# Patient Record
Sex: MALE | Race: WHITE
[De-identification: names, ages, dates, MRNs, and addresses within clinical notes are randomized per-mention and may not be internally consistent; named-entity substitution may affect disease eponyms.]

---

## 2020-01-11 ENCOUNTER — HOSPITAL ENCOUNTER (INPATIENT)
Dept: HOSPITAL 96 - M.ERS | Age: 60
LOS: 6 days | Discharge: HOME HEALTH | DRG: 177 | End: 2020-01-17
Attending: INTERNAL MEDICINE | Admitting: INTERNAL MEDICINE
Payer: COMMERCIAL

## 2020-01-11 VITALS — SYSTOLIC BLOOD PRESSURE: 159 MMHG | DIASTOLIC BLOOD PRESSURE: 84 MMHG

## 2020-01-11 VITALS — HEIGHT: 60 IN | WEIGHT: 230 LBS | BODY MASS INDEX: 45.16 KG/M2

## 2020-01-11 VITALS — DIASTOLIC BLOOD PRESSURE: 90 MMHG | SYSTOLIC BLOOD PRESSURE: 174 MMHG

## 2020-01-11 VITALS — SYSTOLIC BLOOD PRESSURE: 166 MMHG | DIASTOLIC BLOOD PRESSURE: 145 MMHG

## 2020-01-11 VITALS — SYSTOLIC BLOOD PRESSURE: 174 MMHG | DIASTOLIC BLOOD PRESSURE: 105 MMHG

## 2020-01-11 DIAGNOSIS — Z79.51: ICD-10-CM

## 2020-01-11 DIAGNOSIS — G47.30: ICD-10-CM

## 2020-01-11 DIAGNOSIS — I50.33: ICD-10-CM

## 2020-01-11 DIAGNOSIS — Z83.42: ICD-10-CM

## 2020-01-11 DIAGNOSIS — I25.10: ICD-10-CM

## 2020-01-11 DIAGNOSIS — J98.11: ICD-10-CM

## 2020-01-11 DIAGNOSIS — J44.1: ICD-10-CM

## 2020-01-11 DIAGNOSIS — Z79.2: ICD-10-CM

## 2020-01-11 DIAGNOSIS — I26.99: ICD-10-CM

## 2020-01-11 DIAGNOSIS — I82.409: ICD-10-CM

## 2020-01-11 DIAGNOSIS — Z91.030: ICD-10-CM

## 2020-01-11 DIAGNOSIS — Z79.899: ICD-10-CM

## 2020-01-11 DIAGNOSIS — Z79.82: ICD-10-CM

## 2020-01-11 DIAGNOSIS — E78.5: ICD-10-CM

## 2020-01-11 DIAGNOSIS — J44.0: ICD-10-CM

## 2020-01-11 DIAGNOSIS — F17.210: ICD-10-CM

## 2020-01-11 DIAGNOSIS — Z66: ICD-10-CM

## 2020-01-11 DIAGNOSIS — E11.9: ICD-10-CM

## 2020-01-11 DIAGNOSIS — Z88.8: ICD-10-CM

## 2020-01-11 DIAGNOSIS — Z95.5: ICD-10-CM

## 2020-01-11 DIAGNOSIS — E87.6: ICD-10-CM

## 2020-01-11 DIAGNOSIS — I11.0: ICD-10-CM

## 2020-01-11 DIAGNOSIS — J69.0: Primary | ICD-10-CM

## 2020-01-11 DIAGNOSIS — Z82.49: ICD-10-CM

## 2020-01-11 DIAGNOSIS — I24.9: ICD-10-CM

## 2020-01-11 LAB
ABSOLUTE BASOPHILS: 0.1 THOU/UL (ref 0–0.2)
ABSOLUTE EOSINOPHILS: 0.1 THOU/UL (ref 0–0.7)
ABSOLUTE MONOCYTES: 0.9 THOU/UL (ref 0–1.2)
ALBUMIN SERPL-MCNC: 3.1 G/DL (ref 3.4–5)
ALP SERPL-CCNC: 136 U/L (ref 46–116)
ALT SERPL-CCNC: 29 U/L (ref 30–65)
ANION GAP SERPL CALC-SCNC: 8 MMOL/L (ref 7–16)
AST SERPL-CCNC: 22 U/L (ref 15–37)
BASOPHILS NFR BLD AUTO: 1 %
BILIRUB SERPL-MCNC: 0.7 MG/DL
BUN SERPL-MCNC: 12 MG/DL (ref 7–18)
CALCIUM SERPL-MCNC: 8.2 MG/DL (ref 8.5–10.1)
CHLORIDE SERPL-SCNC: 103 MMOL/L (ref 98–107)
CO2 SERPL-SCNC: 31 MMOL/L (ref 21–32)
CREAT SERPL-MCNC: 1.1 MG/DL (ref 0.6–1.3)
EOSINOPHIL NFR BLD: 1.2 %
GLUCOSE SERPL-MCNC: 109 MG/DL (ref 70–99)
GRANULOCYTES NFR BLD MANUAL: 73.9 %
HCT VFR BLD CALC: 40.5 % (ref 42–52)
HGB BLD-MCNC: 13.9 GM/DL (ref 14–18)
INR PPP: 1
LIPASE: 136 U/L (ref 73–393)
LYMPHOCYTES # BLD: 1.9 THOU/UL (ref 0.8–5.3)
LYMPHOCYTES NFR BLD AUTO: 16.4 %
MCH RBC QN AUTO: 27.8 PG (ref 26–34)
MCHC RBC AUTO-ENTMCNC: 34.3 G/DL (ref 28–37)
MCV RBC: 81.1 FL (ref 80–100)
MONOCYTES NFR BLD: 7.5 %
MPV: 8.2 FL. (ref 7.2–11.1)
NEUTROPHILS # BLD: 8.7 THOU/UL (ref 1.6–8.1)
NT-PRO BRAIN NAT PEPTIDE: 1097 PG/ML (ref ?–300)
NUCLEATED RBCS: 0 /100WBC
PLATELET COUNT*: 260 THOU/UL (ref 150–400)
POTASSIUM SERPL-SCNC: 3.3 MMOL/L (ref 3.5–5.1)
PROT SERPL-MCNC: 7 G/DL (ref 6.4–8.2)
PROTHROMBIN TIME: 10.1 SECONDS (ref 9.2–11.5)
RBC # BLD AUTO: 5 MIL/UL (ref 4.5–6)
RDW-CV: 15.6 % (ref 10.5–14.5)
SODIUM SERPL-SCNC: 142 MMOL/L (ref 136–145)
WBC # BLD AUTO: 11.7 THOU/UL (ref 4–11)

## 2020-01-11 NOTE — NUR
pt heart rythm was abnormal on cardiac monitor, ekg done. ekg shows acute MI.
dr Smyth called. dr stated that pt refused to see him and was not going to
answer to his consult, that pt warned his nurse about cursing the doctor.
doctor asked that the ekgs be faxed to his number. ekg faxed to dr Smyth at
[phone] on 1/11/20. pt does not state any current pain. pt just appears to be
short of breath. on 3 l nc of oxygen. DNR paper placed in front of chart. DNR
bracelet on. pt sitting in recliner. tylenol was given for pain early this
shift. pt warns this nurse to "stay away from his heart" and that "he is not
here for his heart but for his pneumonia". call light within reach

## 2020-01-11 NOTE — NUR
ASSUMED PT CARE REPORT RECEIVED FROM NURSE. PT IS AOX4, ON 3 L NC. O2
SATURATION IS 95%. VSS. COMPLAINS OF PAIN LEVEL 8 IN "LUNGS" AND STATED THAT
NOTHING USUALLY ALLEVIATES HIS PAIN.PT STATES THAT HE IS HERE FOR HIS LUNGS,
NOT FOR HIS HEART.IV FLUID INFUSING AS ORDERED. PATIENT REFUSED FOR TROPONIN
LAB TO BE DRAWN. NURSING STAFF HAD PT SIGN THE REFUSAL FOR LAB DRAWN.
ADMISSION HX AND ASSESSMENT DONE.  TROP PRESENTLY AT 0.45.  CARDIOLOGY
CONSULTED. CALL LIGHT AT REACH.  WILL CONTINUE TO MONITOR

## 2020-01-12 VITALS — DIASTOLIC BLOOD PRESSURE: 99 MMHG | SYSTOLIC BLOOD PRESSURE: 181 MMHG

## 2020-01-12 VITALS — SYSTOLIC BLOOD PRESSURE: 146 MMHG | DIASTOLIC BLOOD PRESSURE: 86 MMHG

## 2020-01-12 VITALS — DIASTOLIC BLOOD PRESSURE: 89 MMHG | SYSTOLIC BLOOD PRESSURE: 154 MMHG

## 2020-01-12 VITALS — SYSTOLIC BLOOD PRESSURE: 198 MMHG | DIASTOLIC BLOOD PRESSURE: 94 MMHG

## 2020-01-12 LAB
ANION GAP SERPL CALC-SCNC: 7 MMOL/L (ref 7–16)
BUN SERPL-MCNC: 16 MG/DL (ref 7–18)
CALCIUM SERPL-MCNC: 8 MG/DL (ref 8.5–10.1)
CHLORIDE SERPL-SCNC: 103 MMOL/L (ref 98–107)
CO2 SERPL-SCNC: 31 MMOL/L (ref 21–32)
CREAT SERPL-MCNC: 1.2 MG/DL (ref 0.6–1.3)
GLUCOSE SERPL-MCNC: 156 MG/DL (ref 70–99)
MAGNESIUM SERPL-MCNC: 1.6 MG/DL (ref 1.8–2.4)
POTASSIUM SERPL-SCNC: 3.5 MMOL/L (ref 3.5–5.1)
SODIUM SERPL-SCNC: 141 MMOL/L (ref 136–145)

## 2020-01-12 NOTE — NUR
PT IRRITABLE AND NOT WANTING ANY TREATMENTS FOR HIS HEART. INCREASED SOB
DURING THE DAY, BREATHING TREATMENTS ADMINISTERED PRN AND SCHEDULED. UP AD
ISIDORO. GOOD ORAL INTAKE. SAT AT THE RECLINER THE WHOLE DAY. GOOD URINE OUTPUT.

## 2020-01-12 NOTE — NUR
PT A+O X4. PT SOA WITH EXERTION. OBTAINED EXTENSION TUBING FOR O2 FOR PT TO
AMBULATE TO THE BATHROOM. PT USES CALL LIGHT APROPRIATELY. PT REPORTS CRONIC
ANXIETY/IRRITABILITY. REFUSED LABS AND VITALS WHILE SLEEPING. ON CALL DR
NOTIFIED PT REQUESTED DNR STATUS THROUGH YOU CALL MD. PT REFUSING ALL CARDIAC
WORKUP AND TREATMENT INCLUDING TROPONIN. PRN ATIVAN EFFECTIVE @ HS. CALL LIGHT
IN REACH. HOURLY ROUNDING FOR SAFETY.

## 2020-01-12 NOTE — EKG
[place] [address]
Phone:  [phone]                     ELECTROCARDIOGRAM REPORT      
_______________________________________________________________________________
 
Name:       JERE ALEXANDRA                      Room:           [address]    ADM IN  
M.R.#:  [id number]      Account #:      [id number]  
Admission:  20     Attend Phys:    RADHA Harris
Discharge:               Date of Birth:  [birth year]  
         Report #: [id number]
    [id number]
_______________________________________________________________________________
THIS REPORT FOR:  //name//                      
 
                         [facility] ED
                                       
Test Date:    2020               Test Time:    07:26:44
Pat Name:     JERE ALEXANDRA                Department:   
Patient ID:   [id number]            Room:         [facility]
Gender:       M                        Technician:   RAVINDER
:          1960               Requested By: Jayro Wilder
Order Number: [id number] MD:   José Smyth
                                 Measurements
Intervals                              Axis          
Rate:         98                       P:            73
RI:           187                      QRS:          -66
QRSD:         147                      T:            114
QT:           402                                    
QTc:          514                                    
                           Interpretive Statements
Sinus rhythm
Ventricular premature complex
Probable left atrial enlargement
Left bundle branch block
No previous ECG available for comparison
 
Electronically Signed On 2020 10:32:50 CST by José Smyth
[email or web address]
 
 
 
 
 
 
 
 
 
 
 
 
 
 
 
 
 
  <ELECTRONICALLY SIGNED>
                                           By: HERNANDEZ Smyth MD, [facility]    
  20     1032
D: 20   _____________________________________
T: 20   HERNANDEZ Smyth MD, [facility]      /EPI

## 2020-01-12 NOTE — NUR
PATIENTS CALL LIGHT WENT OFF - WHEN RN ANSWERED, PATIENT REPORTED HE NEEDED TO
GO TO THE RESTROOM.  PATIENT WAS VISIBLY IN RESPIRATORY DISTRESS; HOWEVER,
REFUSED TO ALLOW THIS RN TO APPLY A GAIT BELT FOR BALANCE OR USE A WALKER FOR
SUPPORT.  ONCE AT THE RESTROOM, PATIENT REFUSED TO ALLOW RN INTO THE BATHROOM
WIHT HIM - PATIENT IS A HIGH FALL RISK.  PATIENT VOIDED AND THEN ASSISTED
BACK TO THE RECLINER IN THE ROOM.  RN MOVED THE RECLINER POSITION SO PATIENT
COULD WATCH TV AS REQUESTED.

## 2020-01-13 VITALS — DIASTOLIC BLOOD PRESSURE: 78 MMHG | SYSTOLIC BLOOD PRESSURE: 144 MMHG

## 2020-01-13 VITALS — SYSTOLIC BLOOD PRESSURE: 179 MMHG | DIASTOLIC BLOOD PRESSURE: 95 MMHG

## 2020-01-13 VITALS — SYSTOLIC BLOOD PRESSURE: 192 MMHG | DIASTOLIC BLOOD PRESSURE: 99 MMHG

## 2020-01-13 VITALS — SYSTOLIC BLOOD PRESSURE: 142 MMHG | DIASTOLIC BLOOD PRESSURE: 82 MMHG

## 2020-01-13 NOTE — NUR
PATIENT RESTING IN CHAIR IN ROOM.  VSJOHNNY VILLANUEVA IS DIFFICULT AT TIMES TO
CONVERSE WITH BUT IS REDIRECTABLE.  HE REQUIRES FREQUENT RT TREATMENTS.
ATTEMPTING PLACEMNET PER PATIENT REQUEST.  HOURLY ROUNDING COMPLETED FOR
PATIENT SAFETY.

## 2020-01-13 NOTE — EKG
[place] [address]
Phone:  [phone]                     ELECTROCARDIOGRAM REPORT      
_______________________________________________________________________________
 
Name:       JERE ALEXANDRA                      Room:           [address]    ADM IN  
M.R.#:  [id number]      Account #:      [id number]  
Admission:  20     Attend Phys:    RADHA Harris
Discharge:               Date of Birth:  [birth year]  
         Report #: [id number]
    [id number]
_______________________________________________________________________________
THIS REPORT FOR:  //name//                      
 
                          [facility]
                                       
Test Date:    2020               Test Time:    18:32:06
Pat Name:     JERE ALEXANDRA                Department:   
Patient ID:   [id number]            Room:         [address]
Gender:       M                        Technician:   JACQUE
:          1960               Requested By: Skye Charlton
Order Number: [id number]    Rocky MD:   Luis Bearden
                                 Measurements
Intervals                              Axis          
Rate:         107                      P:            0
[place]:           154                      QRS:          -67
QRSD:         141                      T:            116
QT:           369                                    
QTc:          493                                    
                           Interpretive Statements
Sinus tachycardia
Left bundle-branch block 
Compared to ECG 2020 07:26:44
significant changes not noted
Electronically Signed On 2020 17:04:24 CST by Luis Bearden
[email or web address]
 
 
 
 
 
 
 
 
 
 
 
 
 
 
 
 
 
 
 
  <ELECTRONICALLY SIGNED>
                                           By: Luis Bearden MD, [facility]   
  20     1704
D: 20   _____________________________________
T: 20   Luis Bearden MD, FAC     /EPI

## 2020-01-13 NOTE — NUR
ASSUMED PT CARE AT [facility] 1930. PT IS AWAKE AND ORIENTED X4. PT IS IRRITABLE
AND WANTED TO BE DNR, SAYS "HE DOES NOT FEEL TOO GOOD, HE THINKS HE'S GONNA
DIE". VITAL SIGNS STABLE AND NO DESATURATIONS NOTED, PT DOES NOT APPEAR TO BE
IN DISTRESS. DR RAMEY INFORMED, PT IS NOW "NO CODE". PT ALSO C/O GENERALIZED
ABDOMINAL PAIN, WOULD NOT ALLOW THIS RN TO PALPATE AND AUSCULTATE ABDOMEN,
STATING HE HAS CHRONIC ABDOMINAL PAIN AND THAT HIS ABDOMEN IS "ABOUT TO
EXPLODE". MORPHINE IV GIVEN PER MAR PER DR RAMEY's TELEPHONE ORDER. PT IS
ABLE TO RELAX AND SAYS PAIN IS BETTER AND WAS ABLE TO SLEEP FOR A LITTLE BIT.
PT IS STILL SHORT OF AIR WITH ACTIVITY, NO DESATURATIONS NOTED. SHOWER
PROVIDED PER PT's REQUEST, ACTIVITY FAIRLY TOLERATED. CALL LIGHT WITHIN REACH.
PT IS KEPT CLOSELY MONITORED. HIGH FALL PRECAUTIONS IN PLACE.

## 2020-01-13 NOTE — NUR
MET WITH PT TO DISCUSS HOME SITUATION/DC PLANNING. PT LIVES WITH HIS
X-GIRLFRIEND'S SON. HIS GIRLFRIEND  LAST FALL.  HE STATES HE HAS BEEN
HAVING INCREASED DIFFICULTY MANAGING LATELY D/T ILLNESS.  VOICED HE MAY NEED
TO HAVE PLACEMENT, AT LEAST SNF, NOT SURE ABOUT LTC.  DISCUSSED INCOME, PT NOT
ABLE TO AFFORD ASSISTED LIVING BUT DOES HAVE MEDICAID. DISCUSSED OPTIONS, OPEN
TO ANY SNF IN HIS INSURANCE NETWORK. CALLED AND FAXED REFERRAL TO [facility].  PT INTERESTED IN COMPLETING DPOA, GAVE FORM AND
INSTRUCTION.  WANTS HIS FRIEND/MARY ELLEN ROMERO.  PT STATES HE HAD A GUARDIAN
FOR SEVERAL YEARS WHEN HE LIVED IN [place].  GAVE NAME/ELIZA MAYS.
CALL TO HER PA OFFICE, SPOKE WITH KT.  THE GUARDIANSHIP WAS 'RELEASED' IN
2016, PT IS HIS OWN PERSON.  WILL FOLLOW

## 2020-01-13 NOTE — NUR
FAXED REFERRALS TO [facility] [phone]; [phone] AND [facility] [phone]; [phone]. CONFIRMED WITH MANJIT/INTAKE AT [facility] AND DALILA/ANDREI AT [facility] THAT THEY RECEIVED REFERRAL.

## 2020-01-13 NOTE — EKG
[place] [address]
Phone:  [phone]                     ELECTROCARDIOGRAM REPORT      
_______________________________________________________________________________
 
Name:       JERE ALEXANDRA                      Room:           [address]    ADM IN  
M.R.#:  [id number]      Account #:      [id number]  
Admission:  20     Attend Phys:    RADHA Harris
Discharge:               Date of Birth:  [birth year]  
         Report #: [id number]
    [id number]
_______________________________________________________________________________
THIS REPORT FOR:  //name//                      
 
                          [facility]
                                       
Test Date:    2020               Test Time:    18:33:07
Pat Name:     JERE ALEXANDRA                Department:   
Patient ID:   [id number]            Room:         [address]
Gender:       M                        Technician:   JACQUE
:          1960               Requested By: Wan Kohler
Order Number: [id number]    Rocky MD:   Luis Bearden
                                 Measurements
Intervals                              Axis          
Rate:         120                      P:            226
SD:           126                      QRS:          -66
QRSD:         139                      T:            94
QT:           436                                    
QTc:          617                                    
                           Interpretive Statements
Sinus tachycardia
Left bundle-branch block
Compared to ECG 2020 07:26:44
no significant changes noted
Electronically Signed On 2020 17:05:00 CST by Luis Bearden
[email or web address]
 
 
 
 
 
 
 
 
 
 
 
 
 
 
 
 
 
 
 
  <ELECTRONICALLY SIGNED>
                                           By: Luis Bearden MD, [facility]   
  20     1705
D: 20 1833   _____________________________________
T: 20   Luis Bearden MD, FACC     /EPI

## 2020-01-14 VITALS — SYSTOLIC BLOOD PRESSURE: 154 MMHG | DIASTOLIC BLOOD PRESSURE: 88 MMHG

## 2020-01-14 VITALS — SYSTOLIC BLOOD PRESSURE: 178 MMHG | DIASTOLIC BLOOD PRESSURE: 92 MMHG

## 2020-01-14 VITALS — DIASTOLIC BLOOD PRESSURE: 100 MMHG | SYSTOLIC BLOOD PRESSURE: 186 MMHG

## 2020-01-14 VITALS — SYSTOLIC BLOOD PRESSURE: 161 MMHG | DIASTOLIC BLOOD PRESSURE: 71 MMHG

## 2020-01-14 LAB
ABSOLUTE BASOPHILS: 0.1 THOU/UL (ref 0–0.2)
ABSOLUTE EOSINOPHILS: 0.2 THOU/UL (ref 0–0.7)
ABSOLUTE MONOCYTES: 0.7 THOU/UL (ref 0–1.2)
ALBUMIN SERPL-MCNC: 3 G/DL (ref 3.4–5)
ALP SERPL-CCNC: 137 U/L (ref 46–116)
ALT SERPL-CCNC: 24 U/L (ref 30–65)
ANION GAP SERPL CALC-SCNC: 4 MMOL/L (ref 7–16)
AST SERPL-CCNC: 16 U/L (ref 15–37)
BASOPHILS NFR BLD AUTO: 0.6 %
BILIRUB SERPL-MCNC: 1 MG/DL
BUN SERPL-MCNC: 15 MG/DL (ref 7–18)
CALCIUM SERPL-MCNC: 8.6 MG/DL (ref 8.5–10.1)
CHLORIDE SERPL-SCNC: 99 MMOL/L (ref 98–107)
CO2 SERPL-SCNC: 34 MMOL/L (ref 21–32)
CREAT SERPL-MCNC: 1 MG/DL (ref 0.6–1.3)
EOSINOPHIL NFR BLD: 2.9 %
GLUCOSE SERPL-MCNC: 139 MG/DL (ref 70–99)
GRANULOCYTES NFR BLD MANUAL: 73 %
HCT VFR BLD CALC: 39.8 % (ref 42–52)
HGB BLD-MCNC: 13.8 GM/DL (ref 14–18)
LYMPHOCYTES # BLD: 1.3 THOU/UL (ref 0.8–5.3)
LYMPHOCYTES NFR BLD AUTO: 15.7 %
MCH RBC QN AUTO: 28 PG (ref 26–34)
MCHC RBC AUTO-ENTMCNC: 34.7 G/DL (ref 28–37)
MCV RBC: 80.6 FL (ref 80–100)
MONOCYTES NFR BLD: 7.8 %
MPV: 8.4 FL. (ref 7.2–11.1)
NEUTROPHILS # BLD: 6.1 THOU/UL (ref 1.6–8.1)
NUCLEATED RBCS: 0 /100WBC
PLATELET COUNT*: 274 THOU/UL (ref 150–400)
POTASSIUM SERPL-SCNC: 3.7 MMOL/L (ref 3.5–5.1)
PROT SERPL-MCNC: 6.8 G/DL (ref 6.4–8.2)
RBC # BLD AUTO: 4.93 MIL/UL (ref 4.5–6)
RDW-CV: 15 % (ref 10.5–14.5)
SODIUM SERPL-SCNC: 137 MMOL/L (ref 136–145)
WBC # BLD AUTO: 8.3 THOU/UL (ref 4–11)

## 2020-01-14 NOTE — NUR
PT IS A/OX4,BP ELEVATED IN 180s/100s.MED-SURG STATUS.PT REMAINS ON 4L O2 NC
WITH LABORED BREATHING.PT C/O CHEST TIGHTNESS-RT CALLED FOR BREATHING
TREATMENT.PT HAS BILATERAL LOWER EXTREMITY EDEMA.PT LEFT RSTING IN CHAIR WITH
CALL LIGHT AND FALL PRECAUTIONS IN PLACE.WILL CONTINUE TO MONITOR.

## 2020-01-14 NOTE — NUR
ASSUMED PT CARE AT [facility] 1930. PT IS AWAKE AND ORIENTED X4. VSS ON 4L OF
O2/NC. PT REMAINED SHORT OF AIR,NO DESATURATIONS NOTED.  PT C/O
GENERALIZED BODY PAIN PARTIALLY RELIEVED BY PAIN MEDS GIVEN PER MAR.
PT IS ABLE TO SLEEP SOME THIS SHIFT. HIGH FALL PRECAUTIONS IN PLACE. CALL
LIGHT WITHIN REACH. HOURLY ROUNDING DONE FOR PT SAFETY.

## 2020-01-14 NOTE — 2DMMODE
[place] [address]
Phone:  [phone] 2 D/M-MODE ECHOCARDIOGRAM     
_______________________________________________________________________________
 
Name:         JERE ALEXANDRA                     Room:          [address]    ADM IN 
.R#:    [id number]     Account #:     [id number]  
Admission:    20    Attend Phys:   Wan Kohler
Discharge:                Date of Birth: [birth year]  
Date of Service: 20 1645  Report #:      [id number]
        [id number]
_______________________________________________________________________________
THIS REPORT FOR:  //name//                      
 
 
--------------- APPROVED REPORT --------------
 
 
Study performed:  2020 14:30:01
 
EXAM: Comprehensive 2D, Doppler, and color-flow 
Echocardiogram 
Patient Location: In-Patient   
Room #:  310     Status:  routine
 
      BSA:         2.12
HR: 91 bpm BP:          186/100 mmHg 
Rhythm: NSR     
 
Other Information 
Study Quality: Good
 
Indications
Congestive Heart Failure
Dyspnea 
Elevated Troponin
 
2D Dimensions
IVSd:  20.40 (7-11mm) LVOT Diam:  20.35 (18-24mm) 
LVDd:  51.06 mm  
PWd:  17.32 (7-11mm) Ascending Ao:  29.69 (22-36mm)
LVDs:  36.54 (25-40mm) 
Aortic Root:  32.35 mm 
 
Volumes
Left Atrial Volume (Systole) 
    LA ESV Index:  43.60 mL/m2
 
Aortic Valve
AoV Peak Navarro.:  2.27 m/s 
AO Peak Gr.:  20.56 mmHg LVOT Max P.11 mmHg
AO Mean Gr.:  11.53 mmHg LVOT Mean P.25 mmHg
    LVOT Max V:  1.42 m/s
AO V2 VTI:  37.03 cm  LVOT Mean V:  0.95 m/s
SUNITA (VTI):  2.19 cm2  LVOT V1 VTI:  24.90 cm
AI Placer:  4.76 m/s2  
AI PHT:  286.97 ms  
 
 
 
[place] [address]
Phone:  [phone]                     2 D/M-MODE ECHOCARDIOGRAM     
_______________________________________________________________________________
 
Name:         [facility]                     Room:          [address] [place] 
M.R.#:    [id number]     Account #:     [id number]  
Admission:    20    Attend Phys:   Wan Kohler
Discharge:                Date of Birth: [birth year]  
Date of Service: 20 1645  Report #:      [id number]
        [id number]
_______________________________________________________________________________
Mitral Valve
MV Mean Gr.:  7.03 mmHg  E/A Ratio:  1.36
    MV Decel. Time:  254.21 ms
MV E Max Navarro.:  1.63 m/s 
MV PHT:  73.72 ms  
MVA (PHT):  2.98 cm2  
 
TDI
E/Lateral E':  23.29 
   Lateral E' Navarro.:  0.07 m/s
 
Pulmonary Valve
PV Peak Navarro.:  1.26 m/s PV Peak Gr.:  6.34 mmHg
 
Left Ventricle
The left ventricle is normal size. There is normal LV segmental wall 
motion. Moderate concentric left ventricular hypertrophy. Left 
ventricular systolic function is normal. LVEF is 55-60%. Transmitral 
Doppler flow pattern suggests impaired LV relaxation.
 
Right Ventricle
The right ventricle is normal size. The right ventricular systolic 
function is normal.
 
Atria
Left atrium is moderately dilated. Right atrium is mildly 
dilated.
 
Aortic Valve
Mild aortic valve sclerosis. Mild aortic regurgitation. There is no 
aortic valvular stenosis.
 
Mitral Valve
The mitral valve is normal in structure. Mild mitral regurgitation. 
No evidence of mitral valve stenosis.
 
Tricuspid Valve
The tricuspid valve is normal in structure. Unable to assess PA 
pressure. Trace tricuspid regurgitation.
 
Pulmonic Valve
The pulmonary valve is normal in structure. There is no pulmonic 
valvular regurgitation.
 
Great Vessels
The aortic root is normal in size. IVC is normal in size and 
 
 
[place] [address]
Phone:  [phone]                     2 D/M-MODE ECHOCARDIOGRAM     
_______________________________________________________________________________
 
Name:         [place] [facility]                     Room:          [address] [place] 
.R.#:    [id number]     Account #:     [id number]  
Admission:    20    Attend Phys:   Wan Kohler
Discharge:                Date of Birth: [birth year]  
Date of Service: 20 1645  Report #:      [id number]
        [id number]
_______________________________________________________________________________
collapses >50% with inspiration.
 
Pericardium
There is no pericardial effusion. Left pleural 
effusion.
 
<Conclusion>
The left ventricle is normal size.
Moderate concentric left ventricular hypertrophy.
Left ventricular systolic function is normal.
LVEF is 55-60%.
Transmitral Doppler flow pattern suggests impaired LV 
relaxation.
Left atrium is moderately dilated.
Right atrium is mildly dilated.
Mild aortic valve sclerosis.
Mild aortic regurgitation.
Mild mitral regurgitation.
Trace tricuspid regurgitation.
IVC is normal in size and collapses >50% with inspiration.
Left pleural effusion.
 
 
 
 
 
 
 
 
 
 
 
 
 
 
 
 
 
 
 
 
 
 
 
  <ELECTRONICALLY SIGNED>
                                           By: Luis Bearden MD, FACC   
  20
D: 20   _____________________________________
T: 20   Luis Bearden MD, FACC     /INF

## 2020-01-14 NOTE — NUR
VSS.MED-SURG STATUS.PT REMAINS ON 4L O2 NC.NO C/O PAIN.IV ANTIBIOTICS GIVEN.PT
HAS BEEN IRRITABLE THROUGHOUT SHIFT.PT GIVEN ONE DOSE OF LASIX THIS SHIFT PER
ORDERS BUT REFUSES TO HAVE ANYMORE.PT ALSO MENTIONED HOSPICE TO THE PHYSICAL
THERAPIST-PT WOULD PROBABLY BENEFIT FROM HAVING DISCUSSION WITH DOCTOR.ECHO
COMPLETED.PT SHOWERED.HOURLY ROUNDING COMPLETED FOR PT SAFETY.PT LEFT RESTING
IN RECLINER WITH CALL LIGHT AND FALL PRECAUTIONS IN PLACE.WILL CONTINUE TO
MONITOR FOR DURATION OF SHIFT.

## 2020-01-14 NOTE — NUR
MARIAMA received call from Juany with [facility] who requested updated
information and will continue to review for possible acceptance to SNF. SW to
continue to follow to assist with finalizing safe dc plan.

## 2020-01-14 NOTE — NUR
FAXED UPDATED REFERRAL TO [facility]. [phone]; [phone]. WILL
FOLLOW TO CONFIRM FACILITY RECEIVED.

## 2020-01-14 NOTE — NUR
PT TRANSFERRED TO UNIT PER WHEELCHAIR WITH BELONGINGS. AOX4, PLEASANT AT
PRESENT. RODRIGUEZ WITH TRANSFER FROM WHEELCHAIR TO RECLINER, CONGESTED PRODUCTIVE
COUGH HEARD. O2 4L NC, BUBBLER PLACED. PT DENIES NEEDS AT PRESENT, MENU GIVEN
PER PT REQUEST. CALL LITE IN EASY REACH. WILL CONTINUE TO MONITOR AND PROVIDE
CARES AS NEEDED UNTIL CHANGE OF SHIFT.

## 2020-01-15 VITALS — DIASTOLIC BLOOD PRESSURE: 98 MMHG | SYSTOLIC BLOOD PRESSURE: 178 MMHG

## 2020-01-15 VITALS — SYSTOLIC BLOOD PRESSURE: 181 MMHG | DIASTOLIC BLOOD PRESSURE: 84 MMHG

## 2020-01-15 VITALS — DIASTOLIC BLOOD PRESSURE: 104 MMHG | SYSTOLIC BLOOD PRESSURE: 189 MMHG

## 2020-01-15 NOTE — NUR
FAXED UPDATES TO [facility]. CONFIRMED THAT THEY RECEIVED AND DECLINED
PLACEMENT. D.O.N. SAID THEY COULD NOT MEET PATIENT'S NEEDS.

## 2020-01-15 NOTE — NUR
PATIENT RESTING UP IN RECLINER. PATIENT REFUSING SOME TREATMENT INCLUDING
LASIX, CLINDAMYCIN AND INSULIN. DR RAMEY AWARE. PATIENT ENCOURAGED TO TAKE
MEDS. PATIENT HAS GOOD APPETITE. PATIENT DENIES ANY NEEDS AT THIS TIME. CALL
LIGHT WITHIN REACH.

## 2020-01-15 NOTE — NUR
PT SLEPT IN RECLINER OVERNIGHT, REQUESTING RT TX AFTER MIDNIGHT. O2 4L SAT
98%. CO BACK AND GENERAL PAIN AT HS, IV MORPHINE GIVEN WITH GOOD RESULT. LFA
SLIV, ABX GIVEN AS ORDERED. USING URINAL TO VOID OVERNIGHT. REFUSING INSULIN
AT HS. NO LABS THIS MORNING. PT STATES HE DOES NOT WANT ANY MORE LASIX, "THAT
STUFF IS WHY MY BACK HURTS, IT RUINS MY KIDNEY.". DNR. ABLE TO USE CALL LITE
AND MAKE NEEDS KNOWN.

## 2020-01-16 VITALS — DIASTOLIC BLOOD PRESSURE: 53 MMHG | SYSTOLIC BLOOD PRESSURE: 181 MMHG

## 2020-01-16 VITALS — DIASTOLIC BLOOD PRESSURE: 70 MMHG | SYSTOLIC BLOOD PRESSURE: 144 MMHG

## 2020-01-16 VITALS — SYSTOLIC BLOOD PRESSURE: 181 MMHG | DIASTOLIC BLOOD PRESSURE: 84 MMHG

## 2020-01-16 NOTE — NUR
PATIENT RESTING UP IN CHAIR. PATIENT IS UP STANDBY ASSIST WITH OXYGEN. PATIENT
IS SHORT OF AIR WITH EXERTION. PATIENT HAD COMPLAINTS OF LOWER BACK PAIN THIS
AM, TREATED ADEQUATELY WITH MORPHINE. PATIENT HAS GOOD APPETITE. PATIENT
DENIES ANY NEEDS AT THIS TIME. CALL LIGHT WITHIN REACH.

## 2020-01-16 NOTE — NUR
Alert and oriented x 4. He is up in the room with stand by assist x 1. He is
voiding well. He did have a shower last evening. He continues to be on O2 at
4L n/c. He refused sliding scale insulin. He refused IV antibiotic
Clindamycin. He said it caused him to have L flank/rib pain,like some one
stabbing him. Dr Kohler notified and orders were recieved.Pain med given x
1.

## 2020-01-16 NOTE — NUR
SW met with pt to discuss dc planning.  SW explained SNF referrals stating
that they are unable to accept pt/unable to meet pt needs.  SW discussed
possibility of pt being able to return home with roommate and pt said that he
spoke with roommate who would be in agreement with pt going home and having in
home follow up services.  SW discussed HH options and pt also discussed
preference for hospice services.  SW to fax referral to pt choice of hospice
agency and to prepare for pt dc home.  Pt concerns are oxygen needs, pain
management and being able to be comfortable "for the rest of the life [that pt
has] left".  SW to continue to follow to assist with final safe dc plan.

## 2020-01-16 NOTE — NUR
Nutrition: pt screen for BMI >40.  Pt noted to have good appetite.  Albumin
mildly low.  Meds reviewed.  Pt appears noncompliant with various medical trx.
CM working on placement.  Assessed at low nutriton risk at this time.

## 2020-01-17 VITALS — DIASTOLIC BLOOD PRESSURE: 89 MMHG | SYSTOLIC BLOOD PRESSURE: 176 MMHG

## 2020-01-17 LAB
ABSOLUTE BASOPHILS: 0.1 THOU/UL (ref 0–0.2)
ABSOLUTE EOSINOPHILS: 0.2 THOU/UL (ref 0–0.7)
ABSOLUTE MONOCYTES: 0.8 THOU/UL (ref 0–1.2)
ANION GAP SERPL CALC-SCNC: 6 MMOL/L (ref 7–16)
BASOPHILS NFR BLD AUTO: 1.3 %
BUN SERPL-MCNC: 19 MG/DL (ref 7–18)
CALCIUM SERPL-MCNC: 8.7 MG/DL (ref 8.5–10.1)
CHLORIDE SERPL-SCNC: 99 MMOL/L (ref 98–107)
CO2 SERPL-SCNC: 34 MMOL/L (ref 21–32)
CREAT SERPL-MCNC: 1 MG/DL (ref 0.6–1.3)
EOSINOPHIL NFR BLD: 2.8 %
GLUCOSE SERPL-MCNC: 121 MG/DL (ref 70–99)
GRANULOCYTES NFR BLD MANUAL: 65.8 %
HCT VFR BLD CALC: 37.9 % (ref 42–52)
HGB BLD-MCNC: 13 GM/DL (ref 14–18)
LYMPHOCYTES # BLD: 1.6 THOU/UL (ref 0.8–5.3)
LYMPHOCYTES NFR BLD AUTO: 19.9 %
MCH RBC QN AUTO: 27.5 PG (ref 26–34)
MCHC RBC AUTO-ENTMCNC: 34.4 G/DL (ref 28–37)
MCV RBC: 80.1 FL (ref 80–100)
MONOCYTES NFR BLD: 10.2 %
MPV: 8 FL. (ref 7.2–11.1)
NEUTROPHILS # BLD: 5.2 THOU/UL (ref 1.6–8.1)
NUCLEATED RBCS: 0 /100WBC
PLATELET COUNT*: 278 THOU/UL (ref 150–400)
POTASSIUM SERPL-SCNC: 3.6 MMOL/L (ref 3.5–5.1)
RBC # BLD AUTO: 4.73 MIL/UL (ref 4.5–6)
RDW-CV: 15 % (ref 10.5–14.5)
SODIUM SERPL-SCNC: 139 MMOL/L (ref 136–145)
WBC # BLD AUTO: 7.8 THOU/UL (ref 4–11)

## 2020-01-17 NOTE — NUR
MARIAMA discussed dc planning with Dr Charlton and plan is for dc home with home
health services to follow.  MARIAMA discussed with pt and arranged for pt
preference of [facility].  Agency accepts pt and will help to find PCP
and then will be able to follow.  Pt friend able to provide pt ride home.

## 2020-01-17 NOTE — NUR
ASSESSMENT COMPLETE. PT DC HOME WITH HH. PT SENT WITH ALL BELONGINGS. PT GIVEN
DC INSTRUCTIONS TO FOLLOW UP WITH PCP, PULMONARY, AND CARDIOLOGY. SEE
ASSESSMENT AND VITALS FOR OTHER DETAILS.

## 2020-01-28 ENCOUNTER — HOSPITAL ENCOUNTER (INPATIENT)
Dept: HOSPITAL 96 - M.ERS | Age: 60
LOS: 1 days | Discharge: LEFT BEFORE DISCHARGE | DRG: 191 | End: 2020-01-29
Attending: FAMILY MEDICINE | Admitting: FAMILY MEDICINE
Payer: COMMERCIAL

## 2020-01-28 VITALS — WEIGHT: 230.01 LBS | HEIGHT: 65.98 IN | BODY MASS INDEX: 36.96 KG/M2

## 2020-01-28 VITALS — DIASTOLIC BLOOD PRESSURE: 53 MMHG | SYSTOLIC BLOOD PRESSURE: 111 MMHG

## 2020-01-28 VITALS — SYSTOLIC BLOOD PRESSURE: 106 MMHG | DIASTOLIC BLOOD PRESSURE: 60 MMHG

## 2020-01-28 VITALS — SYSTOLIC BLOOD PRESSURE: 177 MMHG | DIASTOLIC BLOOD PRESSURE: 81 MMHG

## 2020-01-28 DIAGNOSIS — E11.9: ICD-10-CM

## 2020-01-28 DIAGNOSIS — E87.6: ICD-10-CM

## 2020-01-28 DIAGNOSIS — J44.1: Primary | ICD-10-CM

## 2020-01-28 DIAGNOSIS — Z87.01: ICD-10-CM

## 2020-01-28 DIAGNOSIS — Z95.5: ICD-10-CM

## 2020-01-28 DIAGNOSIS — I50.32: ICD-10-CM

## 2020-01-28 DIAGNOSIS — Z91.14: ICD-10-CM

## 2020-01-28 DIAGNOSIS — Z79.84: ICD-10-CM

## 2020-01-28 DIAGNOSIS — Z91.030: ICD-10-CM

## 2020-01-28 DIAGNOSIS — I25.10: ICD-10-CM

## 2020-01-28 DIAGNOSIS — F41.9: ICD-10-CM

## 2020-01-28 DIAGNOSIS — I11.0: ICD-10-CM

## 2020-01-28 DIAGNOSIS — R07.89: ICD-10-CM

## 2020-01-28 DIAGNOSIS — G47.30: ICD-10-CM

## 2020-01-28 DIAGNOSIS — F17.210: ICD-10-CM

## 2020-01-28 DIAGNOSIS — F12.90: ICD-10-CM

## 2020-01-28 DIAGNOSIS — E44.1: ICD-10-CM

## 2020-01-28 DIAGNOSIS — Z79.899: ICD-10-CM

## 2020-01-28 DIAGNOSIS — E78.5: ICD-10-CM

## 2020-01-28 DIAGNOSIS — Z88.8: ICD-10-CM

## 2020-01-28 LAB
ABSOLUTE BASOPHILS: 0.1 THOU/UL (ref 0–0.2)
ABSOLUTE EOSINOPHILS: 0.3 THOU/UL (ref 0–0.7)
ABSOLUTE MONOCYTES: 0.6 THOU/UL (ref 0–1.2)
ALBUMIN SERPL-MCNC: 3.1 G/DL (ref 3.4–5)
ALP SERPL-CCNC: 132 U/L (ref 46–116)
ALT SERPL-CCNC: 32 U/L (ref 30–65)
ANION GAP SERPL CALC-SCNC: 6 MMOL/L (ref 7–16)
APTT BLD: 26.4 SECONDS (ref 25–31.3)
AST SERPL-CCNC: 21 U/L (ref 15–37)
BASOPHILS NFR BLD AUTO: 1.2 %
BILIRUB SERPL-MCNC: 0.6 MG/DL
BUN SERPL-MCNC: 14 MG/DL (ref 7–18)
CALCIUM SERPL-MCNC: 8.4 MG/DL (ref 8.5–10.1)
CHLORIDE SERPL-SCNC: 102 MMOL/L (ref 98–107)
CO2 SERPL-SCNC: 31 MMOL/L (ref 21–32)
CREAT SERPL-MCNC: 1.2 MG/DL (ref 0.6–1.3)
EOSINOPHIL NFR BLD: 4.4 %
GLUCOSE SERPL-MCNC: 193 MG/DL (ref 70–99)
GRANULOCYTES NFR BLD MANUAL: 60.3 %
HCT VFR BLD CALC: 38.8 % (ref 42–52)
HGB BLD-MCNC: 13.5 GM/DL (ref 14–18)
INR PPP: 1
LYMPHOCYTES # BLD: 1.5 THOU/UL (ref 0.8–5.3)
LYMPHOCYTES NFR BLD AUTO: 25 %
MCH RBC QN AUTO: 28.1 PG (ref 26–34)
MCHC RBC AUTO-ENTMCNC: 34.8 G/DL (ref 28–37)
MCV RBC: 80.7 FL (ref 80–100)
MONOCYTES NFR BLD: 9.1 %
MPV: 7.9 FL. (ref 7.2–11.1)
NEUTROPHILS # BLD: 3.7 THOU/UL (ref 1.6–8.1)
NT-PRO BRAIN NAT PEPTIDE: 860 PG/ML (ref ?–300)
NUCLEATED RBCS: 0 /100WBC
PLATELET COUNT*: 255 THOU/UL (ref 150–400)
POTASSIUM SERPL-SCNC: 3.1 MMOL/L (ref 3.5–5.1)
PROT SERPL-MCNC: 6.8 G/DL (ref 6.4–8.2)
PROTHROMBIN TIME: 10.3 SECONDS (ref 9.2–11.5)
RBC # BLD AUTO: 4.81 MIL/UL (ref 4.5–6)
RDW-CV: 14.7 % (ref 10.5–14.5)
SODIUM SERPL-SCNC: 139 MMOL/L (ref 136–145)
WBC # BLD AUTO: 6.2 THOU/UL (ref 4–11)

## 2020-01-28 NOTE — EKG
[place] [address]
Phone:  [phone]                     ELECTROCARDIOGRAM REPORT      
_______________________________________________________________________________
 
Name:       JERE ALEXANDRA                      Room:                      [facility]  
M.R.#:  [id number]      Account #:      [id number]  
Admission:  20     Attend Phys:                         
Discharge:               Date of Birth:  [birth year]  
         Report #: [id number]
    [id number]
_______________________________________________________________________________
THIS REPORT FOR:  //name//                      
 
                         [facility] ED
                                       
Test Date:    2020               Test Time:    14:21:16
Pat Name:     JERE ALEXANDRA                Department:   
Patient ID:   [id number]            Room:          
Gender:                               Technician:   [facility]
:          1960               Requested By: Roddy Paula
Order Number: [id number] MD:   Luis Bearden
                                 Measurements
Intervals                              Axis          
Rate:         90                       P:            62
AZ:           215                      QRS:          -67
QRSD:         152                      T:            96
QT:           385                                    
QTc:          471                                    
                           Interpretive Statements
Sinus rhythm
Prolonged AZ interval
Left bundle branch block
Compared to ECG 2020 18:33:07
First degree AV block now present
Sinus tachycardia no longer present
 
Electronically Signed On 2020 16:39:39 CST by Luis Bearden
[email or web address]
 
 
 
 
 
 
 
 
 
 
 
 
 
 
 
 
  <ELECTRONICALLY SIGNED>
                                           By: Luis Bearden MD, [facility]   
  20     1639
D: 20 1421   _____________________________________
T: 20 142   Luis Bearden MD, FACC     /EPI

## 2020-01-29 VITALS — DIASTOLIC BLOOD PRESSURE: 69 MMHG | SYSTOLIC BLOOD PRESSURE: 128 MMHG

## 2020-01-29 VITALS — SYSTOLIC BLOOD PRESSURE: 144 MMHG | DIASTOLIC BLOOD PRESSURE: 71 MMHG

## 2020-01-29 VITALS — SYSTOLIC BLOOD PRESSURE: 160 MMHG | DIASTOLIC BLOOD PRESSURE: 81 MMHG

## 2020-01-29 LAB
AMP/METHAMP: POSITIVE
BACTERIA-REFLEX: (no result) /HPF
BILIRUB UR-MCNC: NEGATIVE MG/DL
COLOR UR: YELLOW
HYALINE CASTS #/AREA URNS LPF: (no result) /LPF
KETONES UR STRIP-MCNC: NEGATIVE MG/DL
MUCUS: (no result) STRN/LPF
PROT UR QL STRIP: (no result)
RBC # UR STRIP: NEGATIVE /UL
RBC #/AREA URNS HPF: (no result) /HPF (ref 0–2)
SP GR UR STRIP: 1.02 (ref 1–1.03)
SQUAMOUS: (no result) /LPF (ref 0–3)
URINE CLARITY: CLEAR
URINE GLUCOSE-RANDOM: (no result)
URINE LEUKOCYTES-REFLEX: NEGATIVE
URINE NITRITE-REFLEX: NEGATIVE
URINE WBC-REFLEX: (no result) /HPF (ref 0–5)
UROBILINOGEN UR STRIP-ACNC: 0.2 E.U./DL (ref 0.2–1)

## 2020-01-29 NOTE — EKG
[place] [address]
Phone:  [phone]                     ELECTROCARDIOGRAM REPORT      
_______________________________________________________________________________
 
Name:       NASRINJERE                      Room:           [address]    DIS IN  
M.R.#:  [id number]      Account #:      [id number]  
Admission:  20     Attend Phys:    Song Garza
Discharge:  20     Date of Birth:  [birth year]  
         Report #: [id number]
    [id number]
_______________________________________________________________________________
THIS REPORT FOR:  //name//                      
 
                          [facility]
                                       
Test Date:    2020               Test Time:    03:27:28
Pat Name:     JERE ALEXANDRA                Department:   
Patient ID:   [id number]            Room:         [address]
Gender:       M                        Technician:   MAUREEN
:          1960               Requested By: Song Aguilar
Order Number: [id number]    Reading MD:   Akil Mak
                                 Measurements
Intervals                              Axis          
Rate:         63                       P:            26
AK:           213                      QRS:          -60
QRSD:         146                      T:            -82
QT:           450                                    
QTc:          461                                    
                           Interpretive Statements
Sinus rhythm
left axis
septal q waves
Ventricular premature complex
Prolonged AK interval
Left ventricular hypertrophy with repolarization
Baseline wander in lead(s) V1
Compared to ECG 2020 14:21:16
Ventricular premature complex(es) now present
 
Electronically Signed On 2020 11:46:43 CST by Akil Mak
[email or web address]
 
 
 
 
 
 
 
 
 
 
 
 
 
  <ELECTRONICALLY SIGNED>
                                           By: Akil Mak MD, [facility]      
  20     1146
D: 20 0327   _____________________________________
T: 20 0327   Akil Mak MD, [facility]        /EPI

## 2020-08-01 ENCOUNTER — HOSPITAL ENCOUNTER (EMERGENCY)
Dept: HOSPITAL 75 - ER | Age: 60
Discharge: HOME | End: 2020-08-01
Payer: MEDICARE

## 2020-08-01 VITALS — HEIGHT: 67.72 IN | WEIGHT: 198.42 LBS | BODY MASS INDEX: 30.42 KG/M2

## 2020-08-01 VITALS — SYSTOLIC BLOOD PRESSURE: 160 MMHG | DIASTOLIC BLOOD PRESSURE: 92 MMHG

## 2020-08-01 DIAGNOSIS — F17.210: ICD-10-CM

## 2020-08-01 DIAGNOSIS — R10.84: ICD-10-CM

## 2020-08-01 DIAGNOSIS — J44.1: Primary | ICD-10-CM

## 2020-08-01 DIAGNOSIS — Z91.030: ICD-10-CM

## 2020-08-01 DIAGNOSIS — R16.0: ICD-10-CM

## 2020-08-01 DIAGNOSIS — Z88.8: ICD-10-CM

## 2020-08-01 LAB
ALBUMIN SERPL-MCNC: 4.1 GM/DL (ref 3.2–4.5)
ALP SERPL-CCNC: 135 U/L (ref 40–136)
ALT SERPL-CCNC: 20 U/L (ref 0–55)
APTT PPP: YELLOW S
BACTERIA #/AREA URNS HPF: NEGATIVE /HPF
BARBITURATES UR QL: NEGATIVE
BASOPHILS # BLD AUTO: 0 10^3/UL (ref 0–0.1)
BASOPHILS NFR BLD AUTO: 0 % (ref 0–10)
BENZODIAZ UR QL SCN: NEGATIVE
BILIRUB SERPL-MCNC: 0.6 MG/DL (ref 0.1–1)
BILIRUB UR QL STRIP: NEGATIVE
BUN/CREAT SERPL: 13
CALCIUM SERPL-MCNC: 9 MG/DL (ref 8.5–10.1)
CHLORIDE SERPL-SCNC: 97 MMOL/L (ref 98–107)
CO2 SERPL-SCNC: 28 MMOL/L (ref 21–32)
COCAINE UR QL: NEGATIVE
CREAT SERPL-MCNC: 1.12 MG/DL (ref 0.6–1.3)
EOSINOPHIL # BLD AUTO: 0.3 10^3/UL (ref 0–0.3)
EOSINOPHIL NFR BLD AUTO: 3 % (ref 0–10)
ERYTHROCYTE [DISTWIDTH] IN BLOOD BY AUTOMATED COUNT: 14.6 % (ref 10–14.5)
FIBRINOGEN PPP-MCNC: CLEAR MG/DL
GFR SERPLBLD BASED ON 1.73 SQ M-ARVRAT: > 60 ML/MIN
GLUCOSE SERPL-MCNC: 196 MG/DL (ref 70–105)
GLUCOSE UR STRIP-MCNC: NEGATIVE MG/DL
HCT VFR BLD CALC: 40 % (ref 40–54)
HGB BLD-MCNC: 14.2 G/DL (ref 13.3–17.7)
KETONES UR QL STRIP: NEGATIVE
LEUKOCYTE ESTERASE UR QL STRIP: NEGATIVE
LIPASE SERPL-CCNC: 46 U/L (ref 8–78)
LYMPHOCYTES # BLD AUTO: 2.2 X 10^3 (ref 1–4)
LYMPHOCYTES NFR BLD AUTO: 28 % (ref 12–44)
MANUAL DIFFERENTIAL PERFORMED BLD QL: NO
MCH RBC QN AUTO: 28 PG (ref 25–34)
MCHC RBC AUTO-ENTMCNC: 35 G/DL (ref 32–36)
MCV RBC AUTO: 79 FL (ref 80–99)
METHADONE UR QL SCN: NEGATIVE
METHAMPHETAMINE SCREEN URINE S: NEGATIVE
MONOCYTES # BLD AUTO: 0.8 X 10^3 (ref 0–1)
MONOCYTES NFR BLD AUTO: 11 % (ref 0–12)
NEUTROPHILS # BLD AUTO: 4.6 X 10^3 (ref 1.8–7.8)
NEUTROPHILS NFR BLD AUTO: 58 % (ref 42–75)
NITRITE UR QL STRIP: NEGATIVE
OPIATES UR QL SCN: NEGATIVE
OXYCODONE UR QL: NEGATIVE
PH UR STRIP: 6 [PH] (ref 5–9)
PLATELET # BLD: 219 10^3/UL (ref 130–400)
PMV BLD AUTO: 11 FL (ref 7.4–10.4)
POTASSIUM SERPL-SCNC: 3.4 MMOL/L (ref 3.6–5)
PROPOXYPH UR QL: NEGATIVE
PROT SERPL-MCNC: 7.1 GM/DL (ref 6.4–8.2)
PROT UR QL STRIP: (no result)
RBC #/AREA URNS HPF: (no result) /HPF
SODIUM SERPL-SCNC: 136 MMOL/L (ref 135–145)
SP GR UR STRIP: 1.02 (ref 1.02–1.02)
SQUAMOUS #/AREA URNS HPF: (no result) /HPF
TRICYCLICS UR QL SCN: NEGATIVE
WBC # BLD AUTO: 7.9 10^3/UL (ref 4.3–11)
WBC #/AREA URNS HPF: (no result) /HPF

## 2020-08-01 PROCEDURE — 83690 ASSAY OF LIPASE: CPT

## 2020-08-01 PROCEDURE — 81000 URINALYSIS NONAUTO W/SCOPE: CPT

## 2020-08-01 PROCEDURE — 86141 C-REACTIVE PROTEIN HS: CPT

## 2020-08-01 PROCEDURE — 80053 COMPREHEN METABOLIC PANEL: CPT

## 2020-08-01 PROCEDURE — 74177 CT ABD & PELVIS W/CONTRAST: CPT

## 2020-08-01 PROCEDURE — 36415 COLL VENOUS BLD VENIPUNCTURE: CPT

## 2020-08-01 PROCEDURE — 80306 DRUG TEST PRSMV INSTRMNT: CPT

## 2020-08-01 PROCEDURE — 85025 COMPLETE CBC W/AUTO DIFF WBC: CPT

## 2020-08-01 PROCEDURE — 80320 DRUG SCREEN QUANTALCOHOLS: CPT

## 2020-08-01 NOTE — XMS REPORT
[facility]

                             Created on: 2018



Pedro Luis Parag

External Reference #: [id number]

: 1960

Sex: Male



Demographics





                          Address                   [address] [place]  [address]

 

                          Preferred Language        Unknown

 

                          Marital Status            Unknown

 

                          Sabianist Affiliation     Unknown

 

                          Race                      Unknown

 

                          Ethnic Group              Unknown





Author





                          Author                    Parag CHAMBERS

 

                          Organization              [facility][1]

 

                          Address                   [address] [place]  [address]



 

                          Phone                     [phone]







Care Team Providers





                    Care Team Member Name Role                Phone

 

                    LISANDRO CHAMBERS      Unavailable         [phone]







PROBLEMS





          Type      Condition ICD9-CM Code PIO38-LF Code Onset Dates Condition S

tatus SNOMED 

Code

 

          Problem   Essential hypertension           I10                 Active 

   78461873

 

           Problem    Chronic obstructive pulmonary disease, unspecified COPD ty

pe            J44.9                 

Active                                  80563074

 

                          Problem                   Type 2 diabetes mellitus wit

hout complication, without long-term current

use of insulin              E11.9                     Active       135084056

 

                          Problem                   Coronary artery disease invo

lving native coronary artery of native heart

without angina pectoris              I25.10                    Active       1641

[id number]

 

          Problem   Reactive depression           F32.9               Active    

21057892







ALLERGIES

No Information



ENCOUNTERS





                Encounter       Location        Date            Diagnosis

 

                    [facility]       [address]  [address] [id number], [place] [id number]                                 

 

                    [facility] [address] [id number], [place] [id number] 

Dec, 2018                                

 

                          [facility]     [address] [facility] [id number]

[address], [place] [address]

                          20 Dec, 2018               

 

                    [facility]       [address]  [address] [id number], [place] [id number] 

Dec, 2018                                

 

                    [facility] [place] [address]  [address] [id number] [id number] 11 

Dec, 2018                               Coronary artery disease involving native

 coronary artery of native 

heart without angina pectoris I25.10 ; Essential hypertension I10 ; Type 2 
diabetes mellitus without complication, without long-term current use of insulin
E11.9 ; Cellulitis of lower extremity, unspecified laterality L03.119 ; History 
of CHF (congestive heart failure) Z86.79 and Acute bacterial conjunctivitis of 
both eyes H10.33

 

                    [facility]       [address]  [address] [id number] [place], [place] [id number] 11 

Dec, 2018                                

 

                    [facility]     [address] [id number] [place]

S [id number] 

                                        Type 2 diabetes mellitus without complic

ation, without long-term current use of 

insulin E11.9 ; Essential hypertension I10 ; Coronary artery disease involving 
native coronary artery of native heart without angina pectoris I25.10 and 
Chronic obstructive pulmonary disease, unspecified COPD type J44.9

 

                    [facility] [address]       [address] [id number] [place], [place] [id number]                                 

 

                    [facility] [place]       [address] [id number] [place], [place] [id number]                                Coronary artery disease involving native

 coronary artery of native 

heart without angina pectoris I25.10 ; Essential hypertension I10 ; Type 2 
diabetes mellitus without complication, without long-term current use of insulin
E11.9 ; Chronic obstructive pulmonary disease, unspecified COPD type J44.9 and 
Reactive depression F32.9







IMMUNIZATIONS

No Known Immunizations



SOCIAL HISTORY

Never Assessed



REASON FOR VISIT

Returned call



PLAN OF CARE





VITAL SIGNS





MEDICATIONS

Unknown Medications



RESULTS

No Results



PROCEDURES

No Known procedures



INSTRUCTIONS





MEDICATIONS ADMINISTERED

No Known Medications



MEDICAL (GENERAL) HISTORY





                    Type                Description         Date

 

                    Medical History     congestive heart failure  

 

                    Medical History     atherosclerosis      

 

                    Medical History     hypertension         

 

                    Medical History     diabetes type 2      

 

                    Medical History     COPD                 

 

                    Medical History     MI age  27           

 

                    Surgical History    fredi w/screw in right leg 

 

                    Surgical History    stints x2           

 

                    Surgical History    Teeth removal       

 

                    Surgical History    sleep apnea surgery  

 

                    Hospitalization History [facility] Heart issues 2018

 

                    Hospitalization History [facility] for CP and hypertension

 11/15/2018

## 2020-08-01 NOTE — XMS REPORT
[facility]

                             Created on: 2018



Pedro Luis Parag

External Reference #: [id number]

: 1960

Sex: Male



Demographics





                          Address                   [address] [place]  [address]

 

                          Preferred Language        Unknown

 

                          Marital Status            Unknown

 

                          Christianity Affiliation     Unknown

 

                          Race                      Unknown

 

                          Ethnic Group              Unknown





Author





                          Author                    Parag MATA

 

                          Organization              [facility]

 

                          Address                   [address] [place]  [address]



 

                          Phone                     [phone]







Care Team Providers





                    Care Team Member Name Role                Phone

 

                    KEYONA MATA    Unavailable         [phone]







PROBLEMS





          Type      Condition ICD9-CM Code IWP06-AF Code Onset Dates Condition S

tatus SNOMED 

Code

 

          Problem   Essential hypertension           I10                 Active 

   13480892

 

           Problem    Chronic obstructive pulmonary disease, unspecified COPD ty

pe            J44.9                 

Active                                  21680128

 

                          Problem                   Type 2 diabetes mellitus wit

hout complication, without long-term current

use of insulin              E11.9                     Active       919426201

 

                          Problem                   Coronary artery disease invo

lving native coronary artery of native heart

without angina pectoris              I25.10                    Active       1641

[id number]

 

          Problem   Reactive depression           F32.9               Active    

94415195







ALLERGIES

No Information



ENCOUNTERS





                Encounter       Location        Date            Diagnosis

 

                    [facility]  [address] [id number] [id number]                                 

 

                    [facility]  [address] [id number], [place] [id number] 21 

Dec, 2018                                

 

                    [facility]  [address] [id number] [id number] 11 

Dec, 2018                               Coronary artery disease involving native

 coronary artery of native 

heart without angina pectoris I25.10 ; Essential hypertension I10 ; Type 2 
diabetes mellitus without complication, without long-term current use of insulin
E11.9 ; Cellulitis of lower extremity, unspecified laterality L03.119 ; History 
of CHF (congestive heart failure) Z86.79 and Acute bacterial conjunctivitis of 
both eyes H10.33

 

                    [facility]  [address] [id number] [id number] 11 

Dec, 2018                                

 

                    [facility]     [address] [id number] [facility] [id number] 

                                        Type 2 diabetes mellitus without complic

ation, without long-term current use of 

insulin E11.9 ; Essential hypertension I10 ; Coronary artery disease involving 
native coronary artery of native heart without angina pectoris I25.10 and 
Chronic obstructive pulmonary disease, unspecified COPD type J44.9

 

                    [facility]       [address] [id number] [place], [place] [id number]                                 

 

                    [facility]       [address]  [address] [id number] [place], [place] [id number]                                Coronary artery disease involving native

 coronary artery of native 

heart without angina pectoris I25.10 ; Essential hypertension I10 ; Type 2 
diabetes mellitus without complication, without long-term current use of insulin
E11.9 ; Chronic obstructive pulmonary disease, unspecified COPD type J44.9 and 
Reactive depression F32.9







IMMUNIZATIONS

No Known Immunizations



SOCIAL HISTORY

Never Assessed



REASON FOR VISIT

phone call



PLAN OF CARE





VITAL SIGNS





MEDICATIONS

Unknown Medications



RESULTS

No Results



PROCEDURES

No Known procedures



INSTRUCTIONS





MEDICATIONS ADMINISTERED

No Known Medications



MEDICAL (GENERAL) HISTORY





                    Type                Description         Date

 

                    Medical History     congestive heart failure  

 

                    Medical History     atherosclerosis      

 

                    Medical History     hypertension         

 

                    Medical History     diabetes type 2      

 

                    Medical History     COPD                 

 

                    Medical History     MI age  27           

 

                    Surgical History    fredi w/screw in right leg 

 

                    Surgical History    stints x2           

 

                    Surgical History    Teeth removal       

 

                    Surgical History    sleep apnea surgery  

 

                    Hospitalization History [facility] Heart issues 2018

 

                    Hospitalization History [facility] for CP and hypertension

 11/15/2018

## 2020-08-01 NOTE — XMS REPORT
Continuity of Care Document

                             Created on: 2020



CORNELIUS ROY

External Reference #: [id number]

: 1960

Sex: Male



Demographics





                          Address                   [address]  [address]

 

                          Home Phone                [phone] x

 

                          Preferred Language        Unknown

 

                          Marital Status            Unknown

 

                          Shinto Affiliation     Unknown

 

                          Race                      Unknown

 

                          Ethnic Group              Unknown





Author





                          Organization              Unknown

 

                          Address                   Unknown

 

                          Phone                     Unavailable



              



Allergies

      



             Active           Description           Code           Type         

  Severity   

                Reaction           Onset           Reported/Identified          

 

Relationship to Patient                 Clinical Status        

 

             Yes           BEE STINGS           39           Miscellaneous Aller

gy           

Unknown           N/A                        2020                         

  

     

 

                Yes             DIPHENHYDRAMINE           [id number]           D

rug Allergy       

             Unknown           BP HIGH                        2020        

           

                                                 

 

             Yes           FUROSEMIDE           [id number]           Drug Allerg

y           

Unknown           RENAL FAILURE                           2020            

    

                                                 

 

             Yes           LABETALOL           [id number]           Drug Allergy

           

Unknown           VOMIT                        2020                       

  

       

 

                Yes             NITROGLYCERIN           [id number]           Nils

g Allergy         

                Unknown           HEADACHE AND VOMITING                         

  2020       

                                                             



                          



Medications

      



There is no data.                  



Problems

      



             Date Dx Coded           Attending           Type           Code    

       

Diagnosis                               Diagnosed By        

 

             2020           Torito Hernandez

.9           

PNEUMONIA, UNSPECIFIED ORGANISM                    

 

             2020           Sudholt, Torito RUSSO            J44

.1           

CHRONIC OBSTRUCTIVE PULMONARY DISEASE W (ACUTE) EX                    

 

             2020           Sudholt, Torito WILLARD            R06

.02           

SHORTNESS OF BREATH                              

 

             2020           Naomiholt, Torito PENNINGTON18

.9           

PNEUMONIA, UNSPECIFIED ORGANISM                    

 

             2020           Sudholt, Torito RUSSO            J44

.1           

CHRONIC OBSTRUCTIVE PULMONARY DISEASE W (ACUTE) EX                    

 

             2020           NaomiholtTorito            R06

.02           

SHORTNESS OF BREATH                              

 

             2020           Sudholt, Torito PENNINGTON18

.9           

PNEUMONIA, UNSPECIFIED ORGANISM                    

 

             2020           Sudholt, Torito RUSSO            J44

.1           

CHRONIC OBSTRUCTIVE PULMONARY DISEASE W (ACUTE) EX                    

 

             2020           Naomiholt, Torito WILLARD            R06

.02           

SHORTNESS OF BREATH                              

 

             2020           Naomiholt, Torito PENNINGTON18

.9           

PNEUMONIA, UNSPECIFIED ORGANISM                    

 

             2020           Naomiholt, Torito RUSSO            J44

.1           

CHRONIC OBSTRUCTIVE PULMONARY DISEASE W (ACUTE) EX                    

 

             2020           Naomiholt, Torito WILLARD            R06

.02           

SHORTNESS OF BREATH                              

 

             2020           Sudholt, Torito PENNINGTON18

.9           

PNEUMONIA, UNSPECIFIED ORGANISM                    

 

             2020           Sudholt, Torito RUSSO            J44

.1           

CHRONIC OBSTRUCTIVE PULMONARY DISEASE W (ACUTE) EX                    

 

             2020           Sudholt, Torito WLILARD            R06

.02           

SHORTNESS OF BREATH                              

 

             2020           Sudholt, Torito RUSSO            I50

.33           

ACUTE ON CHRONIC DIASTOLIC (CONGESTIVE) HEART FAIL                    

 

             2020           Sudholt, Torito RUSSO            J44

.1           

CHRONIC OBSTRUCTIVE PULMONARY DISEASE W (ACUTE) EX                    

 

             2020           Sudholt, Torito RUSSO            J69

.0           

PNEUMONITIS DUE TO INHALATION OF FOOD AND VOMIT                    

 

             2020           Sudholt, Torito WILLARD            R06

.02           

SHORTNESS OF BREATH                              

 

             2020           Sudholt, Torito RUSSO            I50

.33           

ACUTE ON CHRONIC DIASTOLIC (CONGESTIVE) HEART FAIL                    

 

             2020           Sudholt, Torito RUSSO            J44

.1           

CHRONIC OBSTRUCTIVE PULMONARY DISEASE W (ACUTE) EX                    

 

             2020           Sudholt, Torito RUSSO            J69

.0           

PNEUMONITIS DUE TO INHALATION OF FOOD AND VOMIT                    

 

             2020           Sudholt, Torito WILLARD            R06

.02           

SHORTNESS OF BREATH                              

 

             2020           Sudholt, Torito RUSSO            I50

.33           

ACUTE ON CHRONIC DIASTOLIC (CONGESTIVE) HEART FAIL                    

 

             2020           Sudholt, Torito RUSSO            J44

.1           

CHRONIC OBSTRUCTIVE PULMONARY DISEASE W (ACUTE) EX                    

 

             2020           Sudholt, Torito RUSSO            J69

.0           

PNEUMONITIS DUE TO INHALATION OF FOOD AND VOMIT                    

 

             2020           Sudholt, Torito WILLARD            R06

.02           

SHORTNESS OF BREATH                              

 

             2020           Sudholt, Torito RUSSO            I50

.33           

ACUTE ON CHRONIC DIASTOLIC (CONGESTIVE) HEART FAIL                    

 

             2020           Sudholt, Torito RUSSO            J44

.1           

CHRONIC OBSTRUCTIVE PULMONARY DISEASE W (ACUTE) EX                    

 

             2020           Sudholt, Torito RUSSO            J69

.0           

PNEUMONITIS DUE TO INHALATION OF FOOD AND VOMIT                    

 

             2020           Sudholt, Torito WILLARD            R06

.02           

SHORTNESS OF BREATH                              

 

             2020           Sudholt, Torito RUSSO            I50

.33           

ACUTE ON CHRONIC DIASTOLIC (CONGESTIVE) HEART FAIL                    

 

             2020           Sudholt, Torito RUSSO            J44

.1           

CHRONIC OBSTRUCTIVE PULMONARY DISEASE W (ACUTE) EX                    

 

             2020           Sudholt, Torito RUSSO            J69

.0           

PNEUMONITIS DUE TO INHALATION OF FOOD AND VOMIT                    

 

             2020           Sudholt, Torito WILLARD            R06

.02           

SHORTNESS OF BREATH                              

 

             2020           Sudholt, Torito RUSSO            I50

.33           

ACUTE ON CHRONIC DIASTOLIC (CONGESTIVE) HEART FAIL                    

 

             2020           Sudholt, Torito RUSSO            J44

.1           

CHRONIC OBSTRUCTIVE PULMONARY DISEASE W (ACUTE) EX                    

 

             2020           Sudholt, Torito RUSSO            J69

.0           

PNEUMONITIS DUE TO INHALATION OF FOOD AND VOMIT                    

 

             2020           Sudholt, Torito WILLARD            R06

.02           

SHORTNESS OF BREATH                              

 

             01/15/2020           Sudholt, Torito RUSSO            I50

.33           

ACUTE ON CHRONIC DIASTOLIC (CONGESTIVE) HEART FAIL                    

 

             01/15/2020           Sudholt, Torito RUSSO            J44

.1           

CHRONIC OBSTRUCTIVE PULMONARY DISEASE W (ACUTE) EX                    

 

             01/15/2020           Sudholt, Torito RUSSO            J69

.0           

PNEUMONITIS DUE TO INHALATION OF FOOD AND VOMIT                    

 

             01/15/2020           Sudholt, Torito WILLARD            R06

.02           

SHORTNESS OF BREATH                              

 

             01/15/2020           Sudholt, Torito RUSSO            I50

.33           

ACUTE ON CHRONIC DIASTOLIC (CONGESTIVE) HEART FAIL                    

 

             01/15/2020           Sudholt, Torito RUSSO            J44

.1           

CHRONIC OBSTRUCTIVE PULMONARY DISEASE W (ACUTE) EX                    

 

             01/15/2020           Sudholt, Torito RUSSO            J69

.0           

PNEUMONITIS DUE TO INHALATION OF FOOD AND VOMIT                    

 

             01/15/2020           Sudholt, Torito WILLARD            R06

.02           

SHORTNESS OF BREATH                              

 

             2020           Sudholt, Torito RUSSO            I50

.33           

ACUTE ON CHRONIC DIASTOLIC (CONGESTIVE) HEART FAIL                    

 

             2020           Sudholt, Toirto RUSSO            J44

.1           

CHRONIC OBSTRUCTIVE PULMONARY DISEASE W (ACUTE) EX                    

 

             2020           Sudholt, Torito RUSSO            J69

.0           

PNEUMONITIS DUE TO INHALATION OF FOOD AND VOMIT                    

 

             2020           Sudholt, Torito WILLARD            R06

.02           

SHORTNESS OF BREATH                              

 

             2020           Sudholt, Torito RUSSO            I50

.33           

ACUTE ON CHRONIC DIASTOLIC (CONGESTIVE) HEART FAIL                    

 

             2020           Sudholt, Torito RUSSO            J44

.1           

CHRONIC OBSTRUCTIVE PULMONARY DISEASE W (ACUTE) EX                    

 

             2020           Sudholt, Torito RUSSO            J69

.0           

PNEUMONITIS DUE TO INHALATION OF FOOD AND VOMIT                    

 

             2020           Sudholt, Torito WILLARD            R06

.02           

SHORTNESS OF BREATH                              

 

             2020           Sudholt, Torito RUSSO            I50

.33           

ACUTE ON CHRONIC DIASTOLIC (CONGESTIVE) HEART FAIL                    

 

             2020           Sudholt, Torito RUSSO            J44

.1           

CHRONIC OBSTRUCTIVE PULMONARY DISEASE W (ACUTE) EX                    

 

             2020           Sudholt, Torito RUSSO            J69

.0           

PNEUMONITIS DUE TO INHALATION OF FOOD AND VOMIT                    

 

             2020           Sudholt, Torito WILLARD            R06

.02           

SHORTNESS OF BREATH                              

 

             2020           Sudholt, Torito RUSSO            I50

.33           

ACUTE ON CHRONIC DIASTOLIC (CONGESTIVE) HEART FAIL                    

 

             2020           Sudholt, Torito RUSSO            J44

.1           

CHRONIC OBSTRUCTIVE PULMONARY DISEASE W (ACUTE) EX                    

 

             2020           Sudholt, Torito RUSSO            J69

.0           

PNEUMONITIS DUE TO INHALATION OF FOOD AND VOMIT                    

 

             2020           Sudholt, Torito WILLARD            R06

.02           

SHORTNESS OF BREATH                              

 

             2020           Sudholt, Torito RUSSO            I50

.33           

ACUTE ON CHRONIC DIASTOLIC (CONGESTIVE) HEART FAIL                    

 

             2020           Sudholt, Torito RUSSO            J44

.1           

CHRONIC OBSTRUCTIVE PULMONARY DISEASE W (ACUTE) EX                    

 

             2020           Sudholt, Torito RUSSO            J69

.0           

PNEUMONITIS DUE TO INHALATION OF FOOD AND VOMIT                    

 

             2020           Sudholt, Torito WILLARD            R06

.02           

SHORTNESS OF BREATH                              

 

             2020           Sudholt, Torito RUSSO            I50

.33           

ACUTE ON CHRONIC DIASTOLIC (CONGESTIVE) HEART FAIL                    

 

             2020           Sudholt, Torito RUSSO            J44

.1           

CHRONIC OBSTRUCTIVE PULMONARY DISEASE W (ACUTE) EX                    

 

             2020           Sudholt, Torito RUSSO            J69

.0           

PNEUMONITIS DUE TO INHALATION OF FOOD AND VOMIT                    

 

             2020           Sudholt, Torito WILLARD            R06

.02           

SHORTNESS OF BREATH                              

 

             2020           Sudholt, Torito CURRY            E11

.9           

TYPE 2 DIABETES MELLITUS WITHOUT COMPLICATIONS                    

 

             2020           Sudholt, Torito CURRY            E78

.5           

HYPERLIPIDEMIA, UNSPECIFIED                      

 

             2020           Sudholt, Torito CURRY            E87

.6           

HYPOKALEMIA                                      

 

                2020           Sudholt, Torito CURRY            

   F17.210          

                          NICOTINE DEPENDENCE, CIGARETTES, UNCOMPLICATED        

            

 

             2020           Sudholt, Torito CURRY            G47

.30           

SLEEP APNEA, UNSPECIFIED                         

 

             2020           Sudholt, Torito CURRY            I11

.0           

HYPERTENSIVE HEART DISEASE WITH HEART FAILURE                    

 

             2020           Sudholt, Torito CURRY            I24

.9           

ACUTE ISCHEMIC HEART DISEASE, UNSPECIFIED                    

 

             2020           Sudholt, Torito CURRY            I25

.10           

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                    

 

             2020           Sudholt, Torito CURRY            I26

.99           

OTHER PULMONARY EMBOLISM WITHOUT ACUTE COR PULMONA                    

 

             2020           Sudholt, Torito CURRY            I50

.33           

ACUTE ON CHRONIC DIASTOLIC (CONGESTIVE) HEART FAIL                    

 

                2020           Sudholt, Torito CURRY            

   I82.409          

                          ACUTE EMBOLISM AND THOMBOS UNSP DEEP VN UNSP LOWER    

                

 

             2020           Sudholt, Torito CURRY            J44

.0           

CHR OBSTRUCTIVE PULMON DISEASE WITH (ACUTE) LOWER                     

 

             2020           Sudholt, Torito CURRY            J44

.1           

CHRONIC OBSTRUCTIVE PULMONARY DISEASE W (ACUTE) EX                    

 

             2020           Sudholt, Torito CURRY            J69

.0           

PNEUMONITIS DUE TO INHALATION OF FOOD AND VOMIT                    

 

             2020           Sudholt, Torito CURRY            J98

.11           

ATELECTASIS                                      

 

             2020           Sudholt, Torito WILLARD            R06

.02           

SHORTNESS OF BREATH                              

 

             2020           Sudholt, Torito CURRY            Z66

           DO 

NOT RESUSCITATE                                  

 

             2020           Sudholt, Tortio CURRY            Z79

.2           

LONG TERM (CURRENT) USE OF ANTIBIOTICS                    

 

             2020           Sudholt, Torito CURRY            Z79

.51           

LONG TERM (CURRENT) USE OF INHALED STEROIDS                    

 

             2020           Sudholt, Torito CURRY            Z79

.82           

LONG TERM (CURRENT) USE OF ASPIRIN                    

 

                2020           Sudholt, Torito CURRY            

   Z79.899          

                          OTHER LONG TERM (CURRENT) DRUG THERAPY                

    

 

             2020           Sudholt, Torito CURRY            Z82

.49           

FAMILY HX OF ISCHEM HEART DIS AND OTH DIS OF THE C                    

 

             2020           Sudholt, Torito CURRY            Z83

.42           

FAMILY HISTORY OF FAMILIAL HYPERCHOLESTEROLEMIA                    

 

             2020           Sudholt, Torito CURRY            Z88

.8           

ALLERGY STATUS TO OTH DRUG/MEDS/BIOL SUBST STATUS                    

 

                2020           Sudholt, Torito CURRY            

   Z91.030          

                          BEE ALLERGY STATUS                    

 

             2020           Sudholt, Torito CURRY            Z95

.5           

PRESENCE OF CORONARY ANGIOPLASTY IMPLANT AND GRAFT                    

 

             2020           Sudholt, Torito RUSSO            I50

.33           

ACUTE ON CHRONIC DIASTOLIC (CONGESTIVE) HEART FAIL                    

 

             2020           Sudholt, Torito RUSSO            J44

.1           

CHRONIC OBSTRUCTIVE PULMONARY DISEASE W (ACUTE) EX                    

 

             2020           Sudholt, Torito RUSSO            J69

.0           

PNEUMONITIS DUE TO INHALATION OF FOOD AND VOMIT                    

 

             2020           Naomiholt, Torito WILLARD            R06

.02           

SHORTNESS OF BREATH                              

 

                2020           PolancoKamilah Maurice MD     

          J44.1     

                          CHRONIC OBSTRUCTIVE PULMONARY DISEASE W (ACUTE) EX    

                

 

                2020           PolancoKamilah Maurice MD.           MONTSE     

          R07.9     

                          CHEST PAIN, UNSPECIFIED                    

 

                2020           PolancoKamilah Maurice MD     

          J44.1     

                          CHRONIC OBSTRUCTIVE PULMONARY DISEASE W (ACUTE) EX    

                

 

                2020           PolancoKamilah Maurice MD.           A     

          R07.9     

                          CHEST PAIN, UNSPECIFIED                    

 

                2020           PolancoKamilah Maurice MD     

          J44.1     

                          CHRONIC OBSTRUCTIVE PULMONARY DISEASE W (ACUTE) EX    

                

 

                2020           PolancoKamilah Maurice MD           A     

          R07.9     

                          CHEST PAIN, UNSPECIFIED                    

 

                2020           PolancoKamilah Maurice MD     

          J44.1     

                          CHRONIC OBSTRUCTIVE PULMONARY DISEASE W (ACUTE) EX    

                

 

                2020           PolancoKamilah Maurice MD.           A     

          R07.9     

                          CHEST PAIN, UNSPECIFIED                    

 

                2020           PolancoKamilah Maurice MD           F     

          E11.9     

                          TYPE 2 DIABETES MELLITUS WITHOUT COMPLICATIONS        

            

 

                2020           PolancoKamilah Maurice MD     

          E44.1     

                          MILD PROTEIN-CALORIE MALNUTRITION                    

 

                2020           PolancoKamilah Maurice MD     

          E78.5     

                          HYPERLIPIDEMIA, UNSPECIFIED                    

 

                2020           PolancoKamilah Maurice MD           F     

          E87.6     

                          HYPOKALEMIA                        

 

                2020           PolancoKamilah Chairez MD           F     

          F12.90    

                          CANNABIS USE, UNSPECIFIED, UNCOMPLICATED              

      

 

                2020           PolancoKamilah Chairez MD           F     

          F17.210   

                          NICOTINE DEPENDENCE, CIGARETTES, UNCOMPLICATED        

            

 

                2020           PolancoKamilah Chairez MD           F     

          F41.9     

                          ANXIETY DISORDER, UNSPECIFIED                    

 

                2020           PolancoKamilah Chairez MD           F     

          G47.30    

                          SLEEP APNEA, UNSPECIFIED                    

 

                2020           PolancoKamilah Chairez MD           F     

          I11.0     

                          HYPERTENSIVE HEART DISEASE WITH HEART FAILURE         

           

 

                2020           PolancoKamilah Maurice MD           F     

          I25.10    

                          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    

                

 

                2020           PolancoKamilah Maurice MD           F     

          I50.32    

                          CHRONIC DIASTOLIC (CONGESTIVE) HEART FAILURE          

          

 

                2020           PolancoKamilah Chairez MD     

          J44.1     

                          CHRONIC OBSTRUCTIVE PULMONARY DISEASE W (ACUTE) EX    

                

 

                2020           PolancoKamilah Maurice MD           A     

          R06.02    

                          SHORTNESS OF BREATH                    

 

                2020           PolancoKamilah Maurice MD           F     

          R07.89    

                          OTHER CHEST PAIN                    

 

                2020           PolancoKamilah Maurice MD     

          R07.9     

                          CHEST PAIN, UNSPECIFIED                    

 

                2020           PolancoKamilah Chairez MD           F     

          Z68.37    

                          BODY MASS INDEX (BMI) 37.0-37.9, ADULT                

    

 

                2020           PolancoKamilah Maurice MD           F     

          Z79.84    

                          LONG TERM (CURRENT) USE OF ORAL HYPOGLYCEMIC DRUGS    

                

 

                2020           PolancoKamilah Maurice MD           F     

          Z79.899   

                          OTHER LONG TERM (CURRENT) DRUG THERAPY                

    

 

                2020           PolancoKamilah Maurice MD           F     

          Z87.01    

                          PERSONAL HISTORY OF PNEUMONIA (RECURRENT)             

       

 

                2020           PolancoKamilah Maurice MD           F     

          Z88.8     

                          ALLERGY STATUS TO OTH DRUG/MEDS/BIOL SUBST STATUS     

               

 

                2020           PolancoKamilah Maurice MD           F     

          Z91.030   

                          BEE ALLERGY STATUS                    

 

                2020           PolancoKamilah Maurice MD     

          Z91.14    

                          PATIENT'S OTHER NONCOMPLIANCE WITH MEDICATION SHADIA    

                

 

                2020           PolancoKamilah Maurice MD     

          Z95.5     

                          PRESENCE OF CORONARY ANGIOPLASTY IMPLANT AND GRAFT    

                



                                                                                
                                                                                
                                                                                
                                     



Procedures

      



There is no data.                  



Results

      



                    Test                Result              Range        

 

                                        CMP - 18 13:57         

 

                    GLUCOSE             159 mg/dL           65-99        

 

                    UREA NITROGEN (BUN)           15 mg/dL            7-25      

  

 

                    CREATININE           1.10 mg/dL           0.70-1.33        

 

                    eGFR NON-AFR. AMERICAN           75 mL/min/1.73m2           

> OR = 60        

 

                    eGFR            87 mL/min/1.73m2           >

 OR = 60        

 

                    BUN/CREATININE RATIO           NOT APPLICABLE (calc)        

   6-22        

 

                    SODIUM              137 mmol/L           135-146        

 

                    POTASSIUM           3.9 mmol/L           3.5-5.3        

 

                    CHLORIDE            102 mmol/L                   

 

                    CARBON DIOXIDE           26 mmol/L           20-32        

 

                    CALCIUM             9.3 mg/dL           8.6-10.3        

 

                    PROTEIN, TOTAL           6.3 g/dL            6.1-8.1        

 

                    ALBUMIN             4.2 g/dL            3.6-5.1        

 

                    GLOBULIN            2.1 g/dL (calc)           1.9-3.7       

 

 

                    ALBUMIN/GLOBULIN RATIO           2.0 (calc)           1.0-2.

5        

 

                    BILIRUBIN, TOTAL           0.7 mg/dL           0.2-1.2      

  

 

                    ALKALINE PHOSPHATASE           136 U/L                

     

 

                    AST                 14 U/L              10-35        

 

                    ALT                 15 U/L              9-46        

 

                                        BNP - 18 13:57         

 

                    B TYPE NATRIURETIC PEPTIDE (BNP)           50 pg/mL         

   <100        

 

                                        INFLUENZA A/B ANTIGEN - 20 07:45  

       

 

                    INFLUENZA A ANTIGEN           Negative            Negative  

      

 

                    INFLUENZA B ANTIGEN           Negative            Negative  

      

 

                                        COMPREHENSIVE METABOLIC PANEL - 20

 07:55         

 

                    SODIUM              142 mmol/L           136-145        

 

                    POTASSIUM           3.3 mmol/L           3.5-5.1        

 

                    CHLORIDE            103 mmol/L                   

 

                    CO2                 31 mmol/L           21-32        

 

                    ANION GAP           8 mmol/L            7-16        

 

                    BUN                 12 mg/dL            7-18        

 

                    CREATININE           1.1 mg/dL           0.6-1.3        

 

                    GLUCOSE             109 mg/dL           70-99        

 

                    CALCIUM             8.2 mg/dL           8.5-10.1        

 

                    Est Glomerular Filtration Rate           69 SeeTable        

   NRG        

 

                    SGOT                22 U/L              15-37        

 

                    TOTAL BILIRUBIN           0.7 mg/dL           <0.1-1.0      

  

 

                    ALKALINE PHOSPHATASE           136 U/L                

     

 

                    SGPT                29 U/L              30-65        

 

                    TOTAL PROTEIN           7.0 g/dL            6.4-8.2        

 

                    ALBUMIN             3.1 g/dL            3.4-5.0        

 

                                        LIPASE - 20 07:55         

 

                    LIPASE              136 U/L                     

 

                                        NT-PRO BRAIN LENNOX PEPTIDE - 20 07:5

5         

 

                    NT-PRO BRAIN LENNOX PEPTIDE           1097 pg/mL           <300

        

 

                                        CBC with Differential - 20 07:55  

       

 

                    HEMOGLOBIN           13.9 gm/dL           14.0-18.0        

 

                    WBC                 11.7 thou/uL           4.0-11.0        

 

                    RBC                 5.00 mil/uL           4.50-6.00        

 

                    MCV                 81.1 fL             80.0-100.0        

 

                    MCH                 27.8 pg             26.0-34.0        

 

                    MCHC                34.3 g/dL           28.0-37.0        

 

                    RDW                 15.6 %              10.5-14.5        

 

                    HEMATOCRIT           40.5 %              42.0-52.0        

 

                    PLATELET COUNT*           260 thou/uL           150-400     

   

 

                    MPV                 8.2 fl.             7.2-11.1        

 

                    MANUAL DIFF           NO                  NRG        

 

                    POLYS               73.9 %              NRG        

 

                    LYMPHOCYTES           16.4 %              NRG        

 

                    MONOCYTES           7.5 %               NRG        

 

                    EOSINOPHILS           1.2 %               NRG        

 

                    BASOPHILS           1.0 %               NRG        

 

                    ABSOLUTE NEUTROPHILS           8.7 thou/uL           1.6-8.1

        

 

                    ABSOLUTE LYMPHOCYTES           1.9 thou/uL           0.8-5.3

        

 

                    ABSOLUTE MONOCYTES           0.9 thou/uL           0.0-1.2  

      

 

                    ABSOLUTE EOSINOPHILS           0.1 thou/uL           0.0-0.7

        

 

                    ABSOLUTE BASOPHILS           0.1 thou/uL           0.0-0.2  

      

 

                    NUCLEATED RBCS           0 /100WBC           NRG        

 

                                        LACTIC ACID - 20 07:55         

 

                    LACTIC ACID           1.7 mmol/L           0.4-2.0        

 

                                        TROPONIN-I LEVEL - 20 07:55       

  

 

                    TROPONIN-I LEVEL           0.45 ng/mL           <0.06       

 

 

                                        PROTIME* - 20 07:55         

 

                    PROTIME             10.1 Seconds           9.20-11.50       

 

 

                    INR                 1.0                 NRG        

 

                                        CK-MB MASS - 20 07:55         

 

                    CK-MB MASS           3.9 ng/mL           <0.5-3.6        

 

                                        CULTURE BLOOD* - 20 07:55         

 

                    RBC                 NO GROWTH on 20 at 1013           

 NRG        

 

                                        CULTURE BLOOD* - 20 08:30         

 

                    RBC                 NO GROWTH on 20 at 1013           

 NRG        

 

                                        TROPONIN-I LEVEL - 20 09:37       

  

 

                    TROPONIN-I LEVEL           0.45 ng/mL           <0.06       

 

 

                                        GLUCOSE POCT - 20 20:55         

 

                    GLUCOSE POCT           135 mg/dl           70        

 

                                        BASIC METABOLIC PANEL - 20 07:20  

       

 

                    SODIUM              141 mmol/L           136-145        

 

                    POTASSIUM           3.5 mmol/L           3.5-5.1        

 

                    CHLORIDE            103 mmol/L                   

 

                    CO2                 31 mmol/L           21-32        

 

                    ANION GAP           7 mmol/L            7-16        

 

                    BUN                 16 mg/dL            7-18        

 

                    CREATININE           1.2 mg/dL           0.6-1.3        

 

                    GLUCOSE             156 mg/dL                   

 

                    CALCIUM             8.0 mg/dL           8.5-10.1        

 

                    Est Glomerular Filtration Rate           62 SeeTable        

   NRG        

 

                                        MAGNESIUM - 20 07:20         

 

                    MAGNESIUM           1.6 mg/dL           1.8-2.4        

 

                                        GLUCOSE POCT - 20 07:39         

 

                    GLUCOSE POCT           141 mg/dl                   

 

                                        GLUCOSE POCT - 20 16:29         

 

                    GLUCOSE POCT           144 mg/dl                   

 

                                        GLUCOSE POCT - 20 20:21         

 

                    GLUCOSE POCT           187 mg/dl                   

 

                                        GLUCOSE POCT - 20 07:41         

 

                    GLUCOSE POCT           115 mg/dl                   

 

                                        GLUCOSE POCT - 20 11:43         

 

                    GLUCOSE POCT           152 mg/dl                   

 

                                        GLUCOSE POCT - 20 16:46         

 

                    GLUCOSE POCT           168 mg/dl                   

 

                                        CBC with Differential - 20 06:33  

       

 

                    HEMOGLOBIN           13.8 gm/dL           14.0-18.0        

 

                    WBC                 8.3 thou/uL           4.0-11.0        

 

                    RBC                 4.93 mil/uL           4.50-6.00        

 

                    MCV                 80.6 fL             80.0-100.0        

 

                    MCH                 28.0 pg             26.0-34.0        

 

                    MCHC                34.7 g/dL           28.0-37.0        

 

                    RDW                 15.0 %              10.5-14.5        

 

                    HEMATOCRIT           39.8 %              42.0-52.0        

 

                    PLATELET COUNT*           274 thou/uL           150-400     

   

 

                    MPV                 8.4 fl.             7.2-11.1        

 

                    MANUAL DIFF           NO                  NRG        

 

                    POLYS               73.0 %              NRG        

 

                    LYMPHOCYTES           15.7 %              NRG        

 

                    MONOCYTES           7.8 %               NRG        

 

                    EOSINOPHILS           2.9 %               NRG        

 

                    BASOPHILS           0.6 %               NRG        

 

                    ABSOLUTE NEUTROPHILS           6.1 thou/uL           1.6-8.1

        

 

                    ABSOLUTE LYMPHOCYTES           1.3 thou/uL           0.8-5.3

        

 

                    ABSOLUTE MONOCYTES           0.7 thou/uL           0.0-1.2  

      

 

                    ABSOLUTE EOSINOPHILS           0.2 thou/uL           0.0-0.7

        

 

                    ABSOLUTE BASOPHILS           0.1 thou/uL           0.0-0.2  

      

 

                    NUCLEATED RBCS           0 /100WBC           NRG        

 

                                        COMPREHENSIVE METABOLIC PANEL - 20

 06:35         

 

                    SODIUM              137 mmol/L           136-145        

 

                    POTASSIUM           3.7 mmol/L           3.5-5.1        

 

                    CHLORIDE            99 mmol/L                   

 

                    CO2                 34 mmol/L           21-32        

 

                    ANION GAP           4 mmol/L            7-16        

 

                    BUN                 15 mg/dL            7-18        

 

                    CREATININE           1.0 mg/dL           0.6-1.3        

 

                    GLUCOSE             139 mg/dL                   

 

                    CALCIUM             8.6 mg/dL           8.5-10.1        

 

                    Est Glomerular Filtration Rate           77 SeeTable        

   NRG        

 

                    SGOT                16 U/L              15-37        

 

                    TOTAL BILIRUBIN           1.0 mg/dL           <0.1-1.0      

  

 

                    ALKALINE PHOSPHATASE           137 U/L                

     

 

                    SGPT                24 U/L              30-65        

 

                    TOTAL PROTEIN           6.8 g/dL            6.4-8.2        

 

                    ALBUMIN             3.0 g/dL            3.4-5.0        

 

                                        LACTIC ACID - 20 06:35         

 

                    LACTIC ACID           0.8 mmol/L           0.4-2.0        

 

                                        GLUCOSE POCT - 20 08:02         

 

                    GLUCOSE POCT           133 mg/dl                   

 

                                        GLUCOSE POCT - 20 12:14         

 

                    GLUCOSE POCT           > 500 mg/dl                   

 

                                        GLUCOSE - 20 12:59         

 

                    GLUCOSE             160 mg/dL                   

 

                                        GLUCOSE POCT - 20 17:04         

 

                    GLUCOSE POCT           120 mg/dl                   

 

                                        GLUCOSE POCT - 20 20:34         

 

                    GLUCOSE POCT           196 mg/dl                   

 

                                        GLUCOSE POCT - 01/15/20 08:00         

 

                    GLUCOSE POCT           135 mg/dl                   

 

                                        GLUCOSE POCT - 01/15/20 12:33         

 

                    GLUCOSE POCT           117 mg/dl                   

 

                                        GLUCOSE POCT - 01/15/20 17:22         

 

                    GLUCOSE POCT           161 mg/dl                   

 

                                        GLUCOSE POCT - 01/15/20 20:22         

 

                    GLUCOSE POCT           168 mg/dl                   

 

                                        GLUCOSE POCT - 20 08:24         

 

                    GLUCOSE POCT           117 mg/dl                   

 

                                        GLUCOSE POCT - 20 12:33         

 

                    GLUCOSE POCT           145 mg/dl                   

 

                                        GLUCOSE POCT - 20 16:44         

 

                    GLUCOSE POCT           160 mg/dl                   

 

                                        CBC with Differential - 20 07:35  

       

 

                    HEMOGLOBIN           13.0 gm/dL           14.0-18.0        

 

                    WBC                 7.8 thou/uL           4.0-11.0        

 

                    RBC                 4.73 mil/uL           4.50-6.00        

 

                    MCV                 80.1 fL             80.0-100.0        

 

                    MCH                 27.5 pg             26.0-34.0        

 

                    MCHC                34.4 g/dL           28.0-37.0        

 

                    RDW                 15.0 %              10.5-14.5        

 

                    HEMATOCRIT           37.9 %              42.0-52.0        

 

                    PLATELET COUNT*           278 thou/uL           150-400     

   

 

                    MPV                 8.0 fl.             7.2-11.1        

 

                    MANUAL DIFF           NO                  NRG        

 

                    POLYS               65.8 %              NRG        

 

                    LYMPHOCYTES           19.9 %              NRG        

 

                    MONOCYTES           10.2 %              NRG        

 

                    EOSINOPHILS           2.8 %               NRG        

 

                    BASOPHILS           1.3 %               NRG        

 

                    ABSOLUTE NEUTROPHILS           5.2 thou/uL           1.6-8.1

        

 

                    ABSOLUTE LYMPHOCYTES           1.6 thou/uL           0.8-5.3

        

 

                    ABSOLUTE MONOCYTES           0.8 thou/uL           0.0-1.2  

      

 

                    ABSOLUTE EOSINOPHILS           0.2 thou/uL           0.0-0.7

        

 

                    ABSOLUTE BASOPHILS           0.1 thou/uL           0.0-0.2  

      

 

                    NUCLEATED RBCS           0 /100WBC           NRG        

 

                                        BASIC METABOLIC PANEL - 20 07:35  

       

 

                    SODIUM              139 mmol/L           136-145        

 

                    POTASSIUM           3.6 mmol/L           3.5-5.1        

 

                    CHLORIDE            99 mmol/L                   

 

                    CO2                 34 mmol/L           21-32        

 

                    ANION GAP           6 mmol/L            7-16        

 

                    BUN                 19 mg/dL            7-18        

 

                    CREATININE           1.0 mg/dL           0.6-1.3        

 

                    GLUCOSE             121 mg/dL                   

 

                    CALCIUM             8.7 mg/dL           8.5-10.1        

 

                    Est Glomerular Filtration Rate           77 SeeTable        

   NRG        

 

                                        GLUCOSE POCT - 20 07:49         

 

                    GLUCOSE POCT           112 mg/dl           70-99        

 

                                        GLUCOSE POCT - 20 12:25         

 

                    GLUCOSE POCT           190 mg/dl           70-99        

 

                                        CBC with Differential - 20 14:28  

       

 

                    HEMOGLOBIN           13.5 gm/dL           14.0-18.0        

 

                    WBC                 6.2 thou/uL           4.0-11.0        

 

                    RBC                 4.81 mil/uL           4.50-6.00        

 

                    MCV                 80.7 fL             80.0-100.0        

 

                    MCH                 28.1 pg             26.0-34.0        

 

                    MCHC                34.8 g/dL           28.0-37.0        

 

                    RDW                 14.7 %              10.5-14.5        

 

                    HEMATOCRIT           38.8 %              42.0-52.0        

 

                    PLATELET COUNT*           255 thou/uL           150-400     

   

 

                    MPV                 7.9 fl.             7.2-11.1        

 

                    MANUAL DIFF           NO                  NRG        

 

                    POLYS               60.3 %              NRG        

 

                    LYMPHOCYTES           25.0 %              NRG        

 

                    MONOCYTES           9.1 %               NRG        

 

                    EOSINOPHILS           4.4 %               NRG        

 

                    BASOPHILS           1.2 %               NRG        

 

                    ABSOLUTE NEUTROPHILS           3.7 thou/uL           1.6-8.1

        

 

                    ABSOLUTE LYMPHOCYTES           1.5 thou/uL           0.8-5.3

        

 

                    ABSOLUTE MONOCYTES           0.6 thou/uL           0.0-1.2  

      

 

                    ABSOLUTE EOSINOPHILS           0.3 thou/uL           0.0-0.7

        

 

                    ABSOLUTE BASOPHILS           0.1 thou/uL           0.0-0.2  

      

 

                    NUCLEATED RBCS           0 /100WBC           NRG        

 

                                        PROTIME* - 20 14:28         

 

                    PROTIME             10.3 Seconds           9.20-11.50       

 

 

                    INR                 1.0                 NRG        

 

                                        APTT - 20 14:28         

 

                    APTT                26.4 Seconds           25.0-31.3        

 

                                        COMPREHENSIVE METABOLIC PANEL - 20

 14:28         

 

                    SODIUM              139 mmol/L           136-145        

 

                    POTASSIUM           3.1 mmol/L           3.5-5.1        

 

                    CHLORIDE            102 mmol/L                   

 

                    CO2                 31 mmol/L           21-32        

 

                    ANION GAP           6 mmol/L            7-16        

 

                    BUN                 14 mg/dL            7-18        

 

                    CREATININE           1.2 mg/dL           0.6-1.3        

 

                    GLUCOSE             193 mg/dL           70-99        

 

                    CALCIUM             8.4 mg/dL           8.5-10.1        

 

                    Est Glomerular Filtration Rate           62 SeeTable        

   NRG        

 

                    SGOT                21 U/L              15-37        

 

                    TOTAL BILIRUBIN           0.6 mg/dL           <0.1-1.0      

  

 

                    ALKALINE PHOSPHATASE           132 U/L                

     

 

                    SGPT                32 U/L              30-65        

 

                    TOTAL PROTEIN           6.8 g/dL            6.4-8.2        

 

                    ALBUMIN             3.1 g/dL            3.4-5.0        

 

                                        CPK, TOTAL* - 20 14:28         

 

                    CPK, TOTAL*           95 U/L                      

 

                                        NT-PRO BRAIN LENNOX PEPTIDE - 20 14:2

8         

 

                    NT-PRO BRAIN LENNOX PEPTIDE           860 pg/mL           <300 

       

 

                                        TROPONIN-I LEVEL - 20 14:28       

  

 

                    TROPONIN-I LEVEL           0.33 ng/mL           <0.06       

 

 

                                        LACTIC ACID - 20 14:28         

 

                    LACTIC ACID           1.7 mmol/L           0.4-2.0        

 

                                        ALCOHOL - 20 14:28         

 

                    ALCOHOL             < 10 mg/dL           <10        

 

                                        CULTURE BLOOD* - 20 14:28         

 

                    RBC                 NO GROWTH on 20 at 1501           

 NRG        

 

                                        CULTURE BLOOD* - 20 14:33         

 

                    RBC                 NO GROWTH on 20 at 1500           

 NRG        

 

                                        TROPONIN-I LEVEL - 20 16:27       

  

 

                    TROPONIN-I LEVEL           0.31 ng/mL           <0.06       

 

 

                                        GLUCOSE POCT - 20 20:41         

 

                    GLUCOSE POCT           290 mg/dl           70-99        

 

                                        URINALYSIS-CULTURE IF IND - 20 03:

50         

 

                    COLLECTION METHOD           CLEAN CATCH            NRG      

  

 

                    URINE COLOR           YELLOW              NRG        

 

                    URINE CLARITY           CLEAR               NRG        

 

                    URINE SPECIFIC GRAVITY           1.025               1.005-1

.030        

 

                    URINE pH            6.0                 5.0-8.0        

 

                    URINE PROTEIN            2+                  Negative       

 

 

                    URINE GLUCOSE-RANDOM           1+                  Negative 

       

 

                    URINE KETONES           NEGATIVE            Negative        

 

                    URINE BILIRUBIN           NEGATIVE            Negative      

  

 

                    URINE BLOOD           NEGATIVE            Negative        

 

                    URINE UROBILINOGEN           0.2 E.U./dl           0.2-1.0  

      

 

                    SQUAMOUS            0-3 Few /LPF           0-3        

 

                    URINE RBC           3-10 Few /HPF           0-2        

 

                    MUCUS               4-6 Moderate strn/LPF           None See

n        

 

                    CRYSTALS            None Seen /LPF           None Seen      

  

 

                    URINE NITRITE-REFLEX           NEGATIVE            Negative 

       

 

                    URINE LEUKOCYTES-REFLEX           NEGATIVE            Negati

ve        

 

                    URINE WBC-REFLEX           0-5 Rare /HPF           0-5      

  

 

                    BACTERIA-REFLEX           1-9 Few /HPF           None Seen  

      

 

                    HYALINE CASTS           4-10 Moderate /LPF           None Se

en        

 

                                        DRUGS OF ABUSE - 20 03:50         

 

                    AMP/METHAMP           POSITIVE            Negative        

 

                    BARBITURATES           Negative            Negative        

 

                    BENZODIAZEPINES           Negative            Negative      

  

 

                    COCAINE             Negative            Negative        

 

                    METHADONE           Negative            Negative        

 

                    OPIATES             Negative            Negative        

 

                    PCP                 Negative            Negative        

 

                    THC                 Negative            Negative        

 

                                        LIPID PANEL - 20 14:13         

 

                    CHOLESTEROL, TOTAL           146 mg/dL           <200       

 

 

                    HDL CHOLESTEROL           44 mg/dL            > OR = 40     

   

 

                    TRIGLYCERIDES           100 mg/dL           <150        

 

                    LDL-CHOLESTEROL           82 mg/dL (calc)           NRG     

   

 

                    CHOL/HDLC RATIO           3.3 (calc)           <5.0        

 

                    NON HDL CHOLESTEROL           102 mg/dL (calc)           <13

0        

 

                                        CMP - 20 14:13         

 

                    GLUCOSE             92 mg/dL                    

 

                    UREA NITROGEN (BUN)           11 mg/dL            7-25      

  

 

                    CREATININE           1.01 mg/dL           0.70-1.33        

 

                    eGFR NON-AFR. AMERICAN           82 mL/min/1.73m2           

> OR = 60        

 

                    eGFR            95 mL/min/1.73m2           >

 OR = 60        

 

                    BUN/CREATININE RATIO           NOT APPLICABLE (calc)        

   6-22        

 

                    SODIUM              142 mmol/L           135-146        

 

                    POTASSIUM           3.3 mmol/L           3.5-5.3        

 

                    CHLORIDE            100 mmol/L                   

 

                    CARBON DIOXIDE           30 mmol/L           20-32        

 

                    CALCIUM             9.8 mg/dL           8.6-10.3        

 

                    PROTEIN, TOTAL           7.0 g/dL            6.1-8.1        

 

                    ALBUMIN             4.2 g/dL            3.6-5.1        

 

                    GLOBULIN            2.8 g/dL (calc)           1.9-3.7       

 

 

                    ALBUMIN/GLOBULIN RATIO           1.5 (calc)           1.0-2.

5        

 

                    BILIRUBIN, TOTAL           0.6 mg/dL           0.2-1.2      

  

 

                    ALKALINE PHOSPHATASE           135 U/L                

     

 

                    AST                 30 U/L              10-35        

 

                    ALT                 21 U/L              9-46        

 

                                        CBC - 20 14:13         

 

                    WHITE BLOOD CELL COUNT           8.7 Thousand/uL           3

.8-10.8        

 

                    RED BLOOD CELL COUNT           5.18 Million/uL           4.2

0-5.80        

 

                    HEMOGLOBIN           13.9 g/dL           13.2-17.1        

 

                    HEMATOCRIT           41.9 %              38.5-50.0        

 

                    MCV                 80.9 fL             80.0-100.0        

 

                    MCH                 26.8 pg             27.0-33.0        

 

                    MCHC                33.2 g/dL           32.0-36.0        

 

                    RDW                 14.0 %              11.0-15.0        

 

                    PLATELET COUNT           281 Thousand/uL           140-400  

      

 

                    MPV                 10.7 fL             7.5-12.5        

 

                    ABSOLUTE NEUTROPHILS           6595 cells/uL           1500-

7800        

 

                    ABSOLUTE LYMPHOCYTES           1296 cells/uL           850-3

900        

 

                    ABSOLUTE MONOCYTES           600 cells/uL           200-950 

       

 

                    ABSOLUTE EOSINOPHILS           165 cells/uL           

        

 

                    ABSOLUTE BASOPHILS           44 cells/uL           0-200    

    

 

                    NEUTROPHILS           75.8 %              NRG        

 

                    LYMPHOCYTES           14.9 %              NRG        

 

                    MONOCYTES           6.9 %               NRG        

 

                    EOSINOPHILS           1.9 %               NRG        

 

                    BASOPHILS           0.5 %               NRG        

 

                                        THYROID ANALYZER - 20 14:13       

  

 

                    TSH                 1.22 mIU/L           0.40-4.50        

 

                                        BNP - 20 14:13         

 

                    B TYPE NATRIURETIC PEPTIDE (BNP)           193 pg/mL        

   <100        

 

                                        BMP - 20 09:21         

 

                    GLUCOSE             145 mg/dL           65-99        

 

                    UREA NITROGEN (BUN)           24 mg/dL            7-25      

  

 

                    CREATININE           1.18 mg/dL           0.70-1.33        

 

                    eGFR NON-AFR. AMERICAN           68 mL/min/1.73m2           

> OR = 60        

 

                    eGFR            79 mL/min/1.73m2           >

 OR = 60        

 

                    BUN/CREATININE RATIO           NOT APPLICABLE (calc)        

   6-22        

 

                    SODIUM              139 mmol/L           135-146        

 

                    POTASSIUM           3.7 mmol/L           3.5-5.3        

 

                    CHLORIDE            98 mmol/L                   

 

                    CARBON DIOXIDE           30 mmol/L           20-32        

 

                    CALCIUM             9.0 mg/dL           8.6-10.3        



                                                                                
                                                           



Encounters

      



                ACCT No.           Visit Date/Time           Discharge          

 Status         

             Pt. Type           Provider           Facility           Loc./Unit 

          

Complaint        

 

                [id number]           2020 16:54:00           2020 09:4

0:00           

DIS                 Inpatient           [facility]           M[facility]                      ELEVATED TROPONIN,CHES

T PAIN     

  

 

                [id number]           2020 09:43:00           2020 14:5

5:00           

DIS                 [facility]           [facility]            M[facility]                      PNEUMONIA/ELEVATED TROPONIN/

COPD/HTN    

   

 

                [id number]           2020 12:20:00           2020 23:59:

59           CLS

             [facility]       

     

       

 

             [id number]           2020 08:20:00                              

       Document

Registration                                                                    

 

             [id number]           2020 14:40:00                              

       Document

Registration                                                                    

 

             [id number]           2018 13:20:00                              

       Document

Registration                                                                    

 

             [id number]           2020 00:26:00                        A

CT           

Emergency                 CARO ARDON, ASAF [facility]                 ER                        ABD PAIN

## 2020-08-01 NOTE — XMS REPORT
[facility]

                             Created on: 2018



Parag Cloud

External Reference #: [id number]

: 1960

Sex: Male



Demographics





                          Address                   [address] [place]  [address]

 

                          Preferred Language        Unknown

 

                          Marital Status            Unknown

 

                          Ep[employer] Affiliation     Unknown

 

                          Race                      Unknown

 

                          Ethnic Group              Unknown





Author





                          Author                    Parag LOVE

 

                          [facility]

 

                          Address                   [address] [place]  [address]



 

                          Phone                     [phone]







Care Team Providers





                    Care Team Member Name Role                Phone

 

                    THOR LOVE   Unavailable         [phone]







PROBLEMS





          Type      Condition ICD9-CM Code NMV50-NH Code Onset Dates Condition S

tatus SNOMED 

Code

 

          Problem   Essential hypertension           I10                 Active 

   [id number]

 

           Problem    Chronic obstructive pulmonary disease, unspecified COPD ty

pe            J44.9                 

Active                                  [id number]

 

                          Problem                   Type 2 diabetes mellitus wit

hout complication, without long-term current

use of insulin              E11.9                     Active       [id number]

 

                          Problem                   Coronary artery disease invo

lving native coronary artery of native heart

without angina pectoris              I25.10                    Active       1641

[id number]

 

          Problem   Reactive depression           F32.9               Active    

[id number]







ALLERGIES





             Substance    Reaction     Event Type   Date         Status

 

             Benedryl     elevated blood pressure Non Drug Allergy 11 Dec, 2018 

Active

 

             bee sting    anaphylaxis  Non Drug Allergy 11 Dec, 2018 Active







ENCOUNTERS





                Encounter       Location        Date            Diagnosis

 

                    [facility]       [address] [address] [id number] [id number]                                 

 

                    [email or web address]       [address] [id number], [place] [id number] 21 

Dec, 2018                                

 

                    [email or web address] [id number] [id number] 

Dec, 2018                               Coronary artery disease involving native

 coronary artery of native 

heart without angina pectoris I25.10 ; Essential hypertension I10 ; Type 2 
diabetes mellitus without complication, without long-term current use of insulin
E11.9 ; Cellulitis of lower extremity, unspecified laterality L03.119 ; History 
of CHF (congestive heart failure) Z86.79 and Acute bacterial conjunctivitis of 
both eyes H10.33

 

                    [email or web address]       [address] [id number] [id number] 

Dec, 2018                                

 

                    [facility]     [address] [id number] [facility] [id number] 

                                        Type 2 diabetes mellitus without complic

ation, without long-term current use of 

insulin E11.9 ; Essential hypertension I10 ; Coronary artery disease involving 
native coronary artery of native heart without angina pectoris I25.10 and 
Chronic obstructive pulmonary disease, unspecified COPD type J44.9

 

                    [facility]       [address] [id number] [place] [id number]                                 

 

                    [facility]  [address] [id number] [id number]                                Coronary artery disease involving native

 coronary artery of native 

heart without angina pectoris I25.10 ; Essential hypertension I10 ; Type 2 
diabetes mellitus without complication, without long-term current use of insulin
E11.9 ; Chronic obstructive pulmonary disease, unspecified COPD type J44.9 and 
Reactive depression F32.9







IMMUNIZATIONS

No Known Immunizations



SOCIAL HISTORY

Never Assessed



REASON FOR VISIT

blood pressure  BFERRISMA



PLAN OF CARE





                          Activity                  Details

 

                                         

 

                          Follow Up                 1 Week Reason:BP/cellulitis 

follow up

 

                          Pending Test              LIPID PANEL 

 

                          Pending Test              CMP 

 

                          Pending Test              THYROID ANALYZER 

 

                          Pending Test              BNP 

 

                          Pending Test              EXTRA LAVENDER-TOP TUBE 



                                      



VITAL SIGNS





                    Height              66 in               2018

 

                    Weight              246.3 lbs           2018

 

                    Temperature         98.2 degrees Fahrenheit 2018

 

                    Heart Rate          83 bpm              2018

 

                    Respiratory Rate    22                  2018

 

                    Oximetry            95 %                2018

 

                    BMI                 39.75 kg/m2         2018

 

                    Blood pressure systolic 133 mmHg            2018

 

                    Blood pressure diastolic 76 mmHg             2018







MEDICATIONS





        Medication Instructions Dosage  Frequency Start Date End Date Duration S

tatus

 

        MetFORMIN HCl  mg Orally 2 times a day 2 tablets 12h              

               Active

 

        Prazosin HCl 1 MG Orally Once a day 1 capsule at bedtime 24h            

         30 day(s) 

Active

 

        Bactrim -160 MG Orally Twice a day 1 tablet 12h     11 Dec, 2018  

       10 day(s) 

Active

 

        Norvasc 10 MG Orally Once a day 1 tablet 24h                     90 days

 Active

 

        Lasix 20 MG Orally Once a day 1 tablet 24h     11 Dec, 2018         5 da

ys  Active

 

        Ranexa 500 MG Orally Twice a day 1 tablet 12h                     30 day

(s) Active

 

                    Amaryl 2 MG         Orally Once a day   1 tablet with breakf

ast or the first main meal of 

the day      24h          11 Dec, 2018              90 days      Active

 

                Polytrim 91251-3.1 UNIT/ML Ophthalmic Four times a day 1 drop in

to affected eye 

6h              11 Dec, 2018                    7 days          Active

 

                    Albuterol Sulfate  (90 Base) mcg/act Inhalation 4 varinder

es a day 2 puffs as 

needed       6h                                      Active

 

        Lipitor 80 MG Orally Once a day 1 tablet 24h                     30 day(

s) Active

 

        Clonidine HCl 0.1 MG Orally 3 times a day 1 tablet 8h                   

           Active

 

        Glucometer         as directed                          Acti

ve

 

        Plavix 75 MG Orally Once a day 1 tablet 24h                             

Active

 

           Isosorbide Mononitrate ER 60 mg Orally Once a day 1 tablet in the mor

bolivar 24h                   

                                                    Active

 

             Lisinopril-Hydrochlorothiazide 20-12.5 MG Orally 2 times a day 1 ta

blet     12h          11 

Dec, 2018                               90 days             Active

 

        Carvedilol 25 MG Orally 2 times a day 1 tablet 12h                     9

0 days Active

 

        Anoro Ellipta 62.5-25 MCG/INH Inhalation Once a day 1 puff  24h     19 N

2018                 

Active







RESULTS





                Name            Result          Date            Reference Range

 

                GLUCOSE FINGERSTICK (IN HOUSE)                 2018       

 

                GLU FINGERSTICK 247                              

 

                PC              0830am                           

 

                Lot #           [id number]                          

 

                Exp date        2019                       

 

                URINE DRUG SCREEN (IN HOUSE)                 2018       

 

                Lot #           [id number]                        

 

                Exp date        2020                       

 

                Control         +                                

 

                COCAINE         negative                         

 

                AMPH            negative                         

 

                MTD             negative                         

 

                THC             negative                         

 

                OPIATE          negative                         

 

                BENZO           negative                         

 

                PCP             negative                         

 

                BAR             negative                         

 

                OXY             negative                         

 

                MAMP            negative                         

 

                BUP             negative                         

 

                MDMA            negative                         

 

                TCA             negative                         

 

                UA LONG DIP (IN HOUSE)                 2018       

 

                Lot #           [id number]                           

 

                Exp date        2019                       

 

                Clarity         clear                            

 

                Color           yellow                           

 

                Odor            no                               

 

                GLU             negative                         

 

                YUE             negative                         

 

                KET             negative                         

 

                SG              1.020                            

 

                BLO             negative                         

 

                pH              6.0                              

 

                Protein         1+                               

 

                URO             0.2                              

 

                NIT             negative                         

 

                ZIYAD             1+                               

 

                Lot #                                            

 

                Exp date                                         







PROCEDURES





                Procedure       Date Ordered    Result          Body Site

 

                COMPREHEN METABOLIC PANEL Dec 11, 2018                     

 

                LIPID PANEL     Dec 11, 2018                     

 

                GLUCOSE BLOOD TEST Dec 11, 2018                     

 

                VENIPUNCT, ROUTINE* Dec 11, 2018                     

 

                NATRIURETIC PEPTIDE Dec 11, 2018                     

 

                ASSAY THYROID STIM HORMONE Dec 11, 2018                     

 

                DRUG TEST PRSMV DIR OPT OBS Dec 11, 2018                     

 

                URINALYSIS, AUTO, W/O SCOPE Dec 11, 2018                     







INSTRUCTIONS





MEDICATIONS ADMINISTERED

No Known Medications



MEDICAL (GENERAL) HISTORY





                    Type                Description         Date

 

                    Medical History     congestive heart failure  

 

                    Medical History     atherosclerosis      

 

                    Medical History     hypertension         

 

                    Medical History     diabetes type 2      

 

                    Medical History     COPD                 

 

                    Medical History     MI age  27           

 

                    Surgical History    fredi w/screw in right leg 

 

                    Surgical History    stints x2           2018

 

                    Surgical History    Teeth removal       1996

 

                    Surgical History    sleep apnea surgery  

 

                    Hospitalization History [facility] Heart issues 2018

 

                    Hospitalization History [facility] for CP and hypertension

 11/15/2018

## 2020-08-01 NOTE — XMS REPORT
[facility]

                             Created on: 2018



Pedro Luis Parag

External Reference #: [id number]

: 1960

Sex: Male



Demographics





                          Address                   [address] [place]  [address]

 

                          Preferred Language        Unknown

 

                          Marital Status            Unknown

 

                          Oriental orthodox Affiliation     Unknown

 

                          Race                      Unknown

 

                          Ethnic Group              Unknown





Author





                          Author                    Parag MATA

 

                          [facility]

 

                          Address                   [address] [place]  [address]



 

                          Phone                     [phone]







Care Team Providers





                    Care Team Member Name Role                Phone

 

                    KEYONA MATA    Unavailable         [phone]







PROBLEMS





          Type      Condition ICD9-CM Code EED16-UT Code Onset Dates Condition S

tatus SNOMED 

Code

 

          Problem   Essential hypertension           I10                 Active 

   92017347

 

           Problem    Chronic obstructive pulmonary disease, unspecified COPD ty

pe            J44.9                 

Active                                  [id number]

 

                          Problem                   Type 2 diabetes mellitus wit

hout complication, without long-term current

use of insulin              E11.9                     Active       [id number]

 

                          Problem                   Coronary artery disease invo

lving native coronary artery of native heart

without angina pectoris              I25.10                    Active       1641

[id number]

 

          Problem   Reactive depression           F32.9               Active    

[id number]







ALLERGIES





             Substance    Reaction     Event Type   Date         Status

 

             bee sting    anaphylaxis  Non Drug Allergy  Active

 

             Benedryl     elevated blood pressure Non Drug Allergy  

Active







ENCOUNTERS





                Encounter       Location        Date            Diagnosis

 

                    [facility]       [address] [id number] [id number] 

Dec, 2018                                

 

                    [facility]     [id number] [facility] [id number] 

                                        Type 2 diabetes mellitus without complic

ation, without long-term current use of 

insulin E11.9 ; Essential hypertension I10 ; Coronary artery disease involving 
native coronary artery of native heart without angina pectoris I25.10 and 
Chronic obstructive pulmonary disease, unspecified COPD type J44.9

 

                    [facility]       [address] [id number] [id number]                                 

 

                    [facility]  [address] [id number] [id number]                                Coronary artery disease involving native

 coronary artery of native 

heart without angina pectoris I25.10 ; Essential hypertension I10 ; Type 2 
diabetes mellitus without complication, without long-term current use of insulin
E11.9 ; Chronic obstructive pulmonary disease, unspecified COPD type J44.9 and 
Reactive depression F32.9







IMMUNIZATIONS

No Known Immunizations



SOCIAL HISTORY

Never Assessed



REASON FOR VISIT

Hospital f/u from [facility] for SOB and Cardiac CP    Estelita GERMAN



PLAN OF CARE





                          Activity                  Details

 

                                         

 

                          Follow Up                 ascd Reason:







VITAL SIGNS





                    Height              66 in               2018

 

                    Weight              245.6 lbs           2018

 

                    Temperature         96 degrees Fahrenheit 2018

 

                    Heart Rate          68 bpm              2018

 

                    Respiratory Rate    16                  2018

 

                    BMI                 39.64 kg/m2         2018

 

                    Blood pressure systolic 128 mmHg            2018

 

                    Blood pressure diastolic 82 mmHg             2018







MEDICATIONS





        Medication Instructions Dosage  Frequency Start Date End Date Duration S

tatus

 

        Carvedilol 25 MG Orally 2 times a day 1 tablet 12h                      

       Active

 

        Alprazolam 0.25 MG Orally 3 times a day 1 tablet 8h                     

         Active

 

        Lisinopril 40 MG Orally Once a day 1 tablet 24h                     30 d

ay(s) Active

 

           Isosorbide Mononitrate ER 60 MG Orally Once a day 1 tablet in the mor

bolivar 24h                   

                          30 day(s)                 Active

 

        Clonidine HCl 0.1 MG Orally 3 times a day 1 tablet at bedtime 8h        

                      Active

 

          Hydrochlorothiazide 25 MG Orally Once a day 1 tablet in the morning 24

h                           30 

day(s)                                  Active

 

        HydrALAZINE HCl 50 mg Orally every 8 hours 1 tablet with food 8h        

                      Active

 

        Norvasc 10 MG Orally Once a day 1 tablet 24h                            

 Active

 

                    Albuterol Sulfate  (90 Base) mcg/act Inhalation 4 varinder

es a day 2 puffs as 

needed       6h                                      Active

 

        Lipitor 80 MG Orally Once a day 1 tablet 24h                     30 day(

s) Active

 

        Gabapentin 300 MG Orally 3 times a day 1 capsule 8h                     

 30 day(s) Active

 

        Anoro Ellipta 62.5-25 MCG/INH Inhalation Once a day 1 puff  24h     19 2018                 

Active

 

        Plavix 75 MG Orally Once a day 1 tablet 24h                     30 day(s

) Active

 

        Prazosin HCl 1 MG Orally Once a day 1 capsule at bedtime 24h            

         30 day(s) 

Active

 

        Glucometer         as directed                          Acti

ve

 

        MetFORMIN HCl  MG Orally 2 times a day 2 tablets 12h              

       30 day(s) Active

 

        Ranexa 500 MG Orally Twice a day 1 tablet 12h                     30 day

(s) Active







RESULTS





                Name            Result          Date            Reference Range

 

                A1C (IN HOUSE)                                   

 

                A1C IN HOUSE    9.0                             4.3 - 5.6 %

 

                Previous A1c                                     

 

                Lot             [id number]                             

 

                Exp date        2020                          







PROCEDURES





                Procedure       Date Ordered    Result          Body Site

 

                GLYCATED HEMOGLOBIN TEST 2018                     

 

                Billing Notes on claim 2018                     







INSTRUCTIONS





MEDICATIONS ADMINISTERED

No Known Medications



MEDICAL (GENERAL) HISTORY





                    Type                Description         Date

 

                    Medical History     congestive heart failure  

 

                    Medical History     atherosclerosis      

 

                    Medical History     hypertension         

 

                    Medical History     diabetes type 2      

 

                    Medical History     COPD                 

 

                    Medical History     MI age  27           

 

                    Surgical History    fredi w/screw in right leg 

 

                    Surgical History    stints x2           

 

                    Surgical History    Teeth removal       

 

                    Surgical History    sleep apnea surgery  

 

                    Hospitalization History [facility] Heart issues 2018

 

                    Hospitalization History [facility] for CP and hypertension

 11/15/2018

## 2020-08-01 NOTE — ED ABDOMINAL PAIN
General


Chief Complaint:  Abdominal/GI Problems


Stated Complaint:  ABD PAIN


Nursing Triage Note:  


Patient complaint of  abd pain x1 month with diarrhea


Sepsis Screen:  No Definite Risk


Source of Information:  Patient


Exam Limitations:  No Limitations





History of Present Illness


Date Seen by Provider:  Aug 1, 2020


Time Seen by Provider:  00:34


Initial Comments


This 60-year-old man presents to the emergency room with complaints of upper 

abdominal pain, vomiting 1, and an episode of diarrhea with incontinence.  He 

reports having multiple episodes of similar pain in the past for which she has 

been seen at [facility] in [place].  He lives in [place].  He also is quite 

short of breath and wheezing.  He reports being out of his inhaler.  He states 

he no longer wants to seek care at [facility] or [facility] because they "think I'm 

crazy".  He also claims a chest give him Haldol and then to studies on him 

against his will.  Patient is afebrile and denies any cough.





Allergies and Home Medications


Allergies


Coded Allergies:  


     bee venom protein (honey bee) (Verified  Allergy, Unknown, 8/1/20)


     furosemide (Verified  Adverse Reaction, Unknown, Renal Failure, 8/1/20)


   


   Renal Failure


     labetalol (Verified  Adverse Reaction, Unknown, Headache, 8/1/20)


   


   Headache


     nitroglycerin (Verified  Adverse Reaction, Unknown, Headache, 8/1/20)


   


   Headache





Patient Home Medication List


Home Medication List Reviewed:  Yes





Review of Systems


Review of Systems


Constitutional:  no symptoms reported


EENTM:  No Symptoms Reported


Respiratory:  See HPI


Cardiovascular:  No Symptoms Reported


Gastrointestinal:  See HPI


Genitourinary:  No Symptoms Reported


Musculoskeletal:  no symptoms reported


Skin:  no symptoms reported


Psychiatric/Neurological:  No Symptoms Reported


Endocrine:  No Symptoms Reported


Hematologic/Lymphatic:  No Symptoms Reported





Past Medical-Social-Family Hx


Past Med/Social Hx:  Reviewed Nursing Past Med/Soc Hx


Patient Social History


Alcohol Use:  Occasionally Uses


Recreational Drug Use:  Yes


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


Smoking Status:  Current Everyday Smoker


Type Used:  Cigarettes


2nd Hand Smoke Exposure:  Yes


Recent Foreign Travel:  No


Contact w/Someone Who Travel:  No


Recent Infectious Disease Expo:  No


Physical Abuse:  No


Sexual Abuse:  No


Mistreated:  No


Fear:  No





Immunizations Up To Date


Tetanus Booster (TDap):  Unknown





Past Medical History


Surgeries:  Yes


Coronary Stent


Respiratory:  Yes


Asthma, COPD


Currently Using CPAP:  Yes


Cardiac:  Yes


Coronary Artery Disease, Heart Attack, Hypertension


Neurological:  No


Reproductive Disorders:  No


Gastrointestinal:  No


Musculoskeletal:  No


Endocrine:  No


HEENT:  No


Cancer:  No


Did You Recieve Any Treatments:  No


Psychosocial:  No


Integumentary:  No





Physical Exam


Vital Signs





Vital Signs - First Documented








 8/1/20





 00:30


 


Temp 36.8


 


Pulse 83


 


Resp 24


 


B/P (MAP) 201/115 (143)


 


Pulse Ox 99


 


O2 Delivery Room Air





Capillary Refill : Less Than 3 Seconds


Height/Weight/BMI


Height: '"


Weight: lbs. oz. kg; 30.00 BMI


Method:


General Appearance:  WD/WN, mild distress


HEENT:  PERRL/EOMI, normal ENT inspection


Neck:  normal inspection


Respiratory:  no respiratory distress, no accessory muscle use, wheezing


Cardiovascular:  regular rate, rhythm, no edema, no murmur


Gastrointestinal:  normal bowel sounds, soft, distended, tenderness (generalized

and most prominent in the upper abdomen.)


Extremities:  normal inspection


Neurologic/Psychiatric:  CNs II-XII nml as tested, no motor/sensory deficits, 

alert, normal mood/affect, oriented x 3


Skin:  normal color, warm/dry





Progress/Results/Core Measures


Results/Orders


Lab Results





Laboratory Tests








Test


 8/1/20


00:18 8/1/20


00:40 Range/Units


 


 


Urine Color YELLOW    


 


Urine Clarity CLEAR    


 


Urine pH 6.0   5-9  


 


Urine Specific Gravity 1.020   1.016-1.022  


 


Urine Protein 2+ H  NEGATIVE  


 


Urine Glucose (UA) NEGATIVE   NEGATIVE  


 


Urine Ketones NEGATIVE   NEGATIVE  


 


Urine Nitrite NEGATIVE   NEGATIVE  


 


Urine Bilirubin NEGATIVE   NEGATIVE  


 


Urine Urobilinogen 0.2   < = 1.0  MG/DL


 


Urine Leukocyte Esterase NEGATIVE   NEGATIVE  


 


Urine RBC (Auto) TRACE-I   NEGATIVE  


 


Urine RBC 0-2    /HPF


 


Urine WBC 2-5    /HPF


 


Urine Squamous Epithelial


Cells 0-2 


 


  /HPF





 


Urine Crystals NONE    /LPF


 


Urine Bacteria NEGATIVE    /HPF


 


Urine Casts NONE    /LPF


 


Urine Mucus NEGATIVE    /LPF


 


Urine Culture Indicated NO    


 


Urine Opiates Screen NEGATIVE   NEGATIVE  


 


Urine Oxycodone Screen NEGATIVE   NEGATIVE  


 


Urine Methadone Screen NEGATIVE   NEGATIVE  


 


Urine Propoxyphene Screen NEGATIVE   NEGATIVE  


 


Urine Barbiturates Screen NEGATIVE   NEGATIVE  


 


Ur Tricyclic Antidepressants


Screen NEGATIVE 


 


 NEGATIVE  





 


Urine Phencyclidine Screen NEGATIVE   NEGATIVE  


 


Urine Amphetamines Screen NEGATIVE   NEGATIVE  


 


Urine Methamphetamines Screen NEGATIVE   NEGATIVE  


 


Urine Benzodiazepines Screen NEGATIVE   NEGATIVE  


 


Urine Cocaine Screen NEGATIVE   NEGATIVE  


 


Urine Cannabinoids Screen NEGATIVE   NEGATIVE  


 


White Blood Count


 


 7.9 


 4.3-11.0


10^3/uL


 


Red Blood Count


 


 5.09 


 4.35-5.85


10^6/uL


 


Hemoglobin  14.2  13.3-17.7  G/DL


 


Hematocrit  40  40-54  %


 


Mean Corpuscular Volume  79 L 80-99  FL


 


Mean Corpuscular Hemoglobin  28  25-34  PG


 


Mean Corpuscular Hemoglobin


Concent 


 35 


 32-36  G/DL





 


Red Cell Distribution Width  14.6 H 10.0-14.5  %


 


Platelet Count


 


 219 


 130-400


10^3/uL


 


Mean Platelet Volume  11.0 H 7.4-10.4  FL


 


Neutrophils (%) (Auto)  58  42-75  %


 


Lymphocytes (%) (Auto)  28  12-44  %


 


Monocytes (%) (Auto)  11  0-12  %


 


Eosinophils (%) (Auto)  3  0-10  %


 


Basophils (%) (Auto)  0  0-10  %


 


Neutrophils # (Auto)  4.6  1.8-7.8  X 10^3


 


Lymphocytes # (Auto)  2.2  1.0-4.0  X 10^3


 


Monocytes # (Auto)  0.8  0.0-1.0  X 10^3


 


Eosinophils # (Auto)


 


 0.3 


 0.0-0.3


10^3/uL


 


Basophils # (Auto)


 


 0.0 


 0.0-0.1


10^3/uL


 


Sodium Level  136  135-145  MMOL/L


 


Potassium Level  3.4 L 3.6-5.0  MMOL/L


 


Chloride Level  97 L   MMOL/L


 


Carbon Dioxide Level  28  21-32  MMOL/L


 


Anion Gap  11  5-14  MMOL/L


 


Blood Urea Nitrogen  15  7-18  MG/DL


 


Creatinine


 


 1.12 


 0.60-1.30


MG/DL


 


Estimat Glomerular Filtration


Rate 


 > 60 


  





 


BUN/Creatinine Ratio  13   


 


Glucose Level  196 H   MG/DL


 


Calcium Level  9.0  8.5-10.1  MG/DL


 


Corrected Calcium  8.9  8.5-10.1  MG/DL


 


Total Bilirubin  0.6  0.1-1.0  MG/DL


 


Aspartate Amino Transf


(AST/SGOT) 


 19 


 5-34  U/L





 


Alanine Aminotransferase


(ALT/SGPT) 


 20 


 0-55  U/L





 


Alkaline Phosphatase  135    U/L


 


C-Reactive Protein High


Sensitivity 


 0.56 H


 0.00-0.50


MG/DL


 


Total Protein  7.1  6.4-8.2  GM/DL


 


Albumin  4.1  3.2-4.5  GM/DL


 


Lipase  46  8-78  U/L


 


Serum Alcohol  < 10  <10  MG/DL








My Orders





Orders - ASAF ELIZONDO MD


Ua Culture If Indicated (8/1/20 00:34)


Cbc With Automated Diff (8/1/20 01:04)


Comprehensive Metabolic Panel (8/1/20 01:04)


Hs C Reactive Protein (8/1/20 01:04)


Lipase (8/1/20 01:04)


Ed Iv/Invasive Line Start (8/1/20 01:04)


Famotidine Injection (Pepcid Injection) (8/1/20 01:15)


Ondansetron Injection (Zofran Injectio (8/1/20 01:15)


Fentanyl  Injection (Sublimaze Injection (8/1/20 01:15)


Rx-Albuterol Inhaler (Rx-Ventolin Hfa In (8/1/20 01:04)


Alcohol (8/1/20 01:04)


Drug Screen Stat (Urine) (8/1/20 01:04)


Ct Abdomen/Pelvis W (8/1/20 01:40)


Iohexol Injection (Omnipaque 350 Mg/Ml 1 (8/1/20 02:15)


Received Contrast (Hold Metformin- Contr (8/1/20 02:15)


Ns (Ivpb) (Sodium Chloride 0.9% Ivpb Bag (8/1/20 02:15)


Lidocaine 2% Viscous 15 Ml (Xylocaine Vi (8/1/20 02:45)


Antacid  Suspension (Mylanta  Suspension (8/1/20 02:45)





Medications Given in ED





Current Medications








 Medications  Dose


 Ordered  Sig/Joseph


 Route  Start Time


 Stop Time Status Last Admin


Dose Admin


 


 Al Hydrox/Mg


 Hydrox/Simethicone  30 ml  ONCE  ONCE


 PO  8/1/20 02:45


 8/1/20 02:46 DC 8/1/20 02:40


30 ML


 


 Famotidine  20 mg  ONCE  ONCE


 IVP  8/1/20 01:15


 8/1/20 01:16 DC 8/1/20 01:23


20 MG


 


 Fentanyl Citrate  75 mcg  ONCE  ONCE


 IVP  8/1/20 01:15


 8/1/20 01:16 DC 8/1/20 01:25


75 MCG


 


 Iohexol  100 ml  ONCE  ONCE


 IV  8/1/20 02:15


 8/1/20 02:17 DC 8/1/20 02:15


100 ML


 


 Lidocaine HCl  15 ml  ONCE  ONCE


 PO  8/1/20 02:45


 8/1/20 02:46 DC 8/1/20 02:40


15 ML


 


 Ondansetron HCl  8 mg  ONCE  ONCE


 IVP  8/1/20 01:15


 8/1/20 01:16 DC 8/1/20 01:22


8 MG


 


 Sodium Chloride  100 ml  ONCE  ONCE


 IV  8/1/20 02:15


 8/1/20 02:17 DC 8/1/20 02:15


80 ML








Vital Signs/I&O











 8/1/20 8/1/20





 00:30 03:50


 


Temp 36.8 36.9


 


Pulse 83 76


 


Resp 24 18


 


B/P (MAP) 201/115 (143) 160/92


 


Pulse Ox 99 97


 


O2 Delivery Room Air Room Air














Blood Pressure Mean:                    143











Progress


Progress Note :  


Progress Note


Patient was given an albuterol inhaler which resolved his wheezing.  Fentanyl 

was given for his abdominal pain along with Pepcid and Zofran.  Pain improved 

somewhat.  Workup was unremarkable.  CT was obtained showing no acute 

abnormalities.  He was further treated with GI cocktail but that did not 

significantly improve his pain.  Ultimately, he was discharged with pain of 

unknown cause.





Diagnostic Imaging





   Diagonstic Imaging:  CT


   Plain Films/CT/US/NM/MRI:  abdomen, pelvis


Comments


CT abdomen and pelvis viewed by me and [facility] report reviewed.  No acute 

abnormalities appreciated.  Hepatomegaly noted.





Departure


Impression





   Primary Impression:  


   COPD exacerbation


   Additional Impressions:  


   Upper abdominal pain


   Enlarged liver


Disposition:  01 HOME, SELF-CARE


Condition:  Improved





Departure-Patient Inst.


Decision time for Depature:  03:38


Referrals:  


LISANDRO CHAMBERS (PCP/Family)


Primary Care Physician


Patient Instructions:  Severe Abdominal Pain, Adult (DC)





Add. Discharge Instructions:  


Follow-up with your primary care provider soon as possible.


A definite cause of your pain was not identified in the ER.  Enlarged liver can 

stretch the liver capsule and cause pain which may be a source of the pain you 

are experiencing.  Treatment for this is gradual weight loss of 1-2 pounds per 

week.


I also recommend that you take an antacid medication such as Pepcid or 

omeprazole daily for the next couple weeks.


Return to care if you have worsening symptoms or develop new symptoms such as 

fever.





All discharge instructions reviewed with patient and/or family. Voiced 

understanding.











ASAF ELIZONDO MD         Aug 1, 2020 02:11

## 2020-08-01 NOTE — DIAGNOSTIC IMAGING REPORT
PROCEDURE: CT abdomen and pelvis with contrast.



TECHNIQUE: Multiple contiguous axial images were obtained through

the abdomen and pelvis after administration of intravenous

contrast. Auto Exposure Controls were utilized during the CT exam

to meet ALARA standards for radiation dose reduction. 



INDICATION: Abdominal pain.



No comparison available.



FINDINGS: The visualized lung bases are clear without evidence of

infiltrate or an effusion. There is no pericardial collection.



The liver appears of mildly low density suggesting a component of

steatosis. There is hepatomegaly with the liver measuring up to

20 cm in length. There is no focal intrahepatic abnormality. The

gallbladder nondistended without evidence of biliary dilatation.



The pancreas is unremarkable. The spleen is normal in size. There

is no adrenal mass.



The kidneys appear nonobstructing. There is a punctate stone

within the inferior calyces of the left kidney. There is a larger

stone within the right kidney, also nonobstructing measuring 9

mm. There is a simple appearing right renal cyst.



There are no findings to suggest bowel obstruction. There is no

abnormal bowel thickening. The appendix is normal. There is

moderate stool demonstrated within the colon.



There is no focal inflammation within the omentum or mesentery.

There are no findings of free air, free fluid, abscess or

adenopathy.



There are atherosclerotic calcifications within a normal caliber

aorta. The visualized portion of the spine demonstrates

degenerative endplate changes and facet arthropathy without acute

or suspicious osseous abnormalities. There does appear to likely

be high-grade foraminal stenosis at the L5-S1 level and

high-grade canal stenosis at L4-L5.



IMPRESSION:

1. No CT findings of an acute inflammatory or obstructive process

within the abdomen or pelvis

2. Hepatomegaly with probable steatosis.

3. Nonobstructing renal calculi bilaterally, right larger than

left.

4. No bowel obstruction or appendicitis.

5. Atherosclerosis.

6. Degenerative disc disease and facet arthropathy with

high-grade canal stenosis at L4-L5 and high-grade foraminal

stenosis at the L5-S1 level.

7. I agree with the preliminary [facility] report.



Dictated by: 



  Dictated on workstation # [id number]

## 2020-08-01 NOTE — XMS REPORT
[facility]

                             Created on: 2018



Parag Cloud

External Reference #: [id number]

: 1960

Sex: Male



Demographics





                          Address                   [address]

[place]  [address]

 

                          Preferred Language        Unknown

 

                          Marital Status            Unknown

 

                          Gnosticist Affiliation     Unknown

 

                          Race                      Unknown

 

                          Ethnic Group              Unknown





Author





                          Author                    Parag MATA

 

                          Organization              [facility]

 

                          Address                   [address] [place]  [address]



 

                          Phone                     [phone]







Care Team Providers





                    Care Team Member Name Role                Phone

 

                    KEYONA MATA    Unavailable         [phone]







PROBLEMS





          Type      Condition ICD9-CM Code PMR53-DW Code Onset Dates Condition S

tatus SNOMED 

Code

 

          Problem   Essential hypertension           I10                 Active 

   [id number]

 

           Problem    Chronic obstructive pulmonary disease, unspecified COPD ty

pe            J44.9                 

Active                                  [id number]

 

                          Problem                   Type 2 diabetes mellitus wit

hout complication, without long-term current

use of insulin              E11.9                     Active       [id number]

 

                          Problem                   Coronary artery disease invo

lving native coronary artery of native heart

without angina pectoris              I25.10                    Active       1641

[id number]

 

          Problem   Reactive depression           F32.9               Active    

[id number]







ALLERGIES





             Substance    Reaction     Event Type   Date         Status

 

             bee sting    anaphylaxis  Non Drug Allergy  Active

 

             Benedryl     elevated blood pressure Non Drug Allergy  

Active







ENCOUNTERS





                Encounter       Location        Date            Diagnosis

 

                    [facility] [id number] [place] [id number] 

Dec, 2018                                

 

                    [facility] [id number] [id number]                                Coronary artery disease involving native

 coronary artery of native 

heart without angina pectoris I25.10 ; Essential hypertension I10 ; Type 2 
diabetes mellitus without complication, without long-term current use of insulin
E11.9 ; Chronic obstructive pulmonary disease, unspecified COPD type J44.9 and 
Reactive depression F32.9







IMMUNIZATIONS

No Known Immunizations



SOCIAL HISTORY

Never Assessed



REASON FOR VISIT

Establish Care Eriberto GERMAN, Needs med refills Eriberto GERMAN, Med list verified with tess Schrader RN



PLAN OF CARE





                          Activity                  Details

 

                                         

 

                          Follow Up                 4 Weeks Reason:htn  est  car

e







VITAL SIGNS





                    Height              66 in               2018

 

                    Weight              253.0 lbs           2018

 

                    Temperature         96.3 degrees Fahrenheit 2018

 

                    Heart Rate          98 bpm              2018

 

                    Respiratory Rate    22                  2018

 

                    BMI                 40.83 kg/m2         2018

 

                    Blood pressure systolic 212 mmHg            2018

 

                    Blood pressure diastolic 92 mmHg             2018







MEDICATIONS





        Medication Instructions Dosage  Frequency Start Date End Date Duration S

tatus

 

        Plavix 75 MG Orally Once a day 1 tablet 24h                     30 day(s

) Active

 

        Lisinopril 40 MG Orally Once a day 1 tablet 24h                     30 d

ay(s) Active

 

        Lipitor 80 MG Orally Once a day 1 tablet 24h                     30 day(

s) Active

 

                    Albuterol Sulfate  (90 Base) mcg/act Inhalation 4 varinder

es a day 2 puffs as 

needed       6h                                      Active

 

        Carvedilol 25 MG Orally 2 times a day 1 tablet 12h                      

       Active

 

        Norvasc 2.5 MG Orally Once a day 1 tablet 24h                     30 day

(s) Active

 

        Gabapentin 300 MG Orally 3 times a day 1 capsule 8h                     

 30 day(s) Active

 

           Isosorbide Mononitrate ER 60 MG Orally Once a day 1 tablet in the mor

bolivar 24h                   

                          30 day(s)                 Active

 

        Prazosin HCl 1 MG Orally Once a day 1 capsule at bedtime 24h            

         30 day(s) 

Active

 

        Glucometer         as directed                          Acti

ve

 

        MetFORMIN HCl  MG Orally 2 times a day 2 tablets 12h              

       30 day(s) Active







RESULTS

No Results



PROCEDURES

No Known procedures



INSTRUCTIONS





MEDICATIONS ADMINISTERED

No Known Medications



MEDICAL (GENERAL) HISTORY





                    Type                Description         Date

 

                    Medical History     congestive heart failure  

 

                    Medical History     atherosclerosis      

 

                    Medical History     hypertension         

 

                    Medical History     diabetes type 2      

 

                    Medical History     COPD                 

 

                    Medical History     MI age  27           

 

                    Surgical History    fredi w/screw in right leg 

 

                    Surgical History    stints x2           

 

                    Surgical History    Teeth removal       

 

                    Surgical History    sleep apnea surgery  

 

                    Hospitalization History [facility] 2018

## 2021-04-14 NOTE — XMS REPORT
Energy is low he needs supplements like 50,000 units weeklyHemoglobin A1c is up 9.6. Needs to take something like Januvia or other type of medicine in addition to the Metformin. Cholesterol not bad the LDL is 106 and should be less than 100 and the triglycerides are 173 should be less than 150 so just tighten up on the diet will help that but did not help her sugars too high so I would take the Metformin plus Januvia. Thyroid is normal.  Blood count is normal without anemia or infection. Liver function is normal.  Kidney function shows very mild renal insufficiency very close to normal does so could be little dehydration from fasting. We will keep an eye on this. [facility]

                             Created on: 2018



Pedro LuisParag

External Reference #: [id number]

: 1960

Sex: Male



Demographics





                          Address                   [address]

[place]  [address]

 

                          Preferred Language        Unknown

 

                          Marital Status            Unknown

 

                          Uatsdin Affiliation     Unknown

 

                          Race                      Unknown

 

                          Ethnic Group              Unknown





Author





                          Author                    Parag MATA

 

                          [facility]

 

                          Address                   [address] [place]  [address]



 

                          Phone                     [phone]







Care Team Providers





                    Care Team Member Name Role                Phone

 

                    KEYONA MATA    Unavailable         [phone]







PROBLEMS





          Type      Condition ICD9-CM Code HGW37-EY Code Onset Dates Condition S

tatus SNOMED 

Code

 

          Problem   Essential hypertension           I10                 Active 

   [id number]

 

           Problem    Chronic obstructive pulmonary disease, unspecified COPD ty

pe            J44.9                 

Active                                  [id number]

 

                          Problem                   Type 2 diabetes mellitus wit

hout complication, without long-term current

use of insulin              E11.9                     Active       [id number]

 

                          Problem                   Coronary artery disease invo

lving native coronary artery of native heart

without angina pectoris              I25.10                    Active       1641

[id number]

 

          Problem   Reactive depression           F32.9               Active    

[id number]







ALLERGIES

No Information



ENCOUNTERS





                Encounter       Location        Date            Diagnosis

 

                    [facility]       [id number] [id number] 

Dec, 2018                                

 

                    [facility]       [address] [facility] [id number] [id number]                                 

 

                    [facility]       [id number] [id number]                                Coronary artery disease involving native

 coronary artery of native 

heart without angina pectoris I25.10 ; Essential hypertension I10 ; Type 2 
diabetes mellitus without complication, without long-term current use of insulin
E11.9 ; Chronic obstructive pulmonary disease, unspecified COPD type J44.9 and 
Reactive depression F32.9







IMMUNIZATIONS

No Known Immunizations



SOCIAL HISTORY

Never Assessed



REASON FOR VISIT





PLAN OF CARE





VITAL SIGNS





MEDICATIONS

Unknown Medications



RESULTS

No Results



PROCEDURES

No Known procedures



INSTRUCTIONS





MEDICATIONS ADMINISTERED

No Known Medications



MEDICAL (GENERAL) HISTORY





                    Type                Description         Date

 

                    Medical History     congestive heart failure  

 

                    Medical History     atherosclerosis      

 

                    Medical History     hypertension         

 

                    Medical History     diabetes type 2      

 

                    Medical History     COPD                 

 

                    Medical History     MI age  27           

 

                    Surgical History    fredi w/screw in right leg 

 

                    Surgical History    stints x2           

 

                    Surgical History    Teeth removal       

 

                    Surgical History    sleep apnea surgery  

 

                    Hospitalization History [facility] 2018